# Patient Record
Sex: FEMALE | Race: WHITE | Employment: FULL TIME | URBAN - METROPOLITAN AREA
[De-identification: names, ages, dates, MRNs, and addresses within clinical notes are randomized per-mention and may not be internally consistent; named-entity substitution may affect disease eponyms.]

---

## 2017-12-01 ENCOUNTER — TRANSFERRED RECORDS (OUTPATIENT)
Dept: HEALTH INFORMATION MANAGEMENT | Facility: CLINIC | Age: 57
End: 2017-12-01

## 2017-12-01 ENCOUNTER — MEDICAL CORRESPONDENCE (OUTPATIENT)
Dept: HEALTH INFORMATION MANAGEMENT | Facility: CLINIC | Age: 57
End: 2017-12-01

## 2017-12-04 ENCOUNTER — MEDICAL CORRESPONDENCE (OUTPATIENT)
Dept: HEALTH INFORMATION MANAGEMENT | Facility: CLINIC | Age: 57
End: 2017-12-04

## 2018-03-18 ENCOUNTER — HEALTH MAINTENANCE LETTER (OUTPATIENT)
Age: 58
End: 2018-03-18

## 2018-03-19 ENCOUNTER — TELEPHONE (OUTPATIENT)
Dept: GASTROENTEROLOGY | Facility: CLINIC | Age: 58
End: 2018-03-19

## 2018-03-19 NOTE — TELEPHONE ENCOUNTER
Insurance: [employer]  Preferred Agent: This plan has open access, no preferred agent  Notes:

## 2018-03-22 DIAGNOSIS — Z76.89 ENCOUNTER TO ESTABLISH CARE: Primary | ICD-10-CM

## 2018-03-23 ENCOUNTER — OFFICE VISIT (OUTPATIENT)
Dept: GASTROENTEROLOGY | Facility: CLINIC | Age: 58
End: 2018-03-23
Attending: INTERNAL MEDICINE
Payer: COMMERCIAL

## 2018-03-23 VITALS
WEIGHT: 163 LBS | HEIGHT: 63 IN | TEMPERATURE: 98 F | BODY MASS INDEX: 28.88 KG/M2 | SYSTOLIC BLOOD PRESSURE: 115 MMHG | HEART RATE: 89 BPM | OXYGEN SATURATION: 94 % | DIASTOLIC BLOOD PRESSURE: 76 MMHG

## 2018-03-23 DIAGNOSIS — K83.01 PSC (PRIMARY SCLEROSING CHOLANGITIS) (H): Primary | ICD-10-CM

## 2018-03-23 DIAGNOSIS — Z76.89 ENCOUNTER TO ESTABLISH CARE: ICD-10-CM

## 2018-03-23 DIAGNOSIS — K50.00 CROHN'S DISEASE OF SMALL INTESTINE WITHOUT COMPLICATION (H): ICD-10-CM

## 2018-03-23 LAB
ALBUMIN SERPL-MCNC: 3.6 G/DL (ref 3.4–5)
ALP SERPL-CCNC: 92 U/L (ref 40–150)
ALT SERPL W P-5'-P-CCNC: 14 U/L (ref 0–50)
ANION GAP SERPL CALCULATED.3IONS-SCNC: 8 MMOL/L (ref 3–14)
AST SERPL W P-5'-P-CCNC: 12 U/L (ref 0–45)
BILIRUB DIRECT SERPL-MCNC: <0.1 MG/DL (ref 0–0.2)
BILIRUB SERPL-MCNC: 0.2 MG/DL (ref 0.2–1.3)
BUN SERPL-MCNC: 11 MG/DL (ref 7–30)
CALCIUM SERPL-MCNC: 8.7 MG/DL (ref 8.5–10.1)
CHLORIDE SERPL-SCNC: 109 MMOL/L (ref 94–109)
CO2 SERPL-SCNC: 23 MMOL/L (ref 20–32)
CREAT SERPL-MCNC: 0.77 MG/DL (ref 0.52–1.04)
ERYTHROCYTE [DISTWIDTH] IN BLOOD BY AUTOMATED COUNT: 16.1 % (ref 10–15)
GFR SERPL CREATININE-BSD FRML MDRD: 77 ML/MIN/1.7M2
GLUCOSE SERPL-MCNC: 90 MG/DL (ref 70–99)
HBV CORE AB SERPL QL IA: NONREACTIVE
HBV SURFACE AB SERPL IA-ACNC: 45.77 M[IU]/ML
HBV SURFACE AG SERPL QL IA: NONREACTIVE
HCT VFR BLD AUTO: 34.6 % (ref 35–47)
HCV AB SERPL QL IA: NONREACTIVE
HGB BLD-MCNC: 10.1 G/DL (ref 11.7–15.7)
INR PPP: 0.93 (ref 0.86–1.14)
MCH RBC QN AUTO: 24.3 PG (ref 26.5–33)
MCHC RBC AUTO-ENTMCNC: 29.2 G/DL (ref 31.5–36.5)
MCV RBC AUTO: 83 FL (ref 78–100)
PLATELET # BLD AUTO: 271 10E9/L (ref 150–450)
POTASSIUM SERPL-SCNC: 3.6 MMOL/L (ref 3.4–5.3)
PROT SERPL-MCNC: 7 G/DL (ref 6.8–8.8)
RBC # BLD AUTO: 4.15 10E12/L (ref 3.8–5.2)
SODIUM SERPL-SCNC: 140 MMOL/L (ref 133–144)
WBC # BLD AUTO: 7.3 10E9/L (ref 4–11)

## 2018-03-23 PROCEDURE — 87340 HEPATITIS B SURFACE AG IA: CPT | Performed by: INTERNAL MEDICINE

## 2018-03-23 PROCEDURE — 86704 HEP B CORE ANTIBODY TOTAL: CPT | Performed by: INTERNAL MEDICINE

## 2018-03-23 PROCEDURE — G0463 HOSPITAL OUTPT CLINIC VISIT: HCPCS | Mod: ZF

## 2018-03-23 PROCEDURE — 86803 HEPATITIS C AB TEST: CPT | Performed by: INTERNAL MEDICINE

## 2018-03-23 PROCEDURE — 80048 BASIC METABOLIC PNL TOTAL CA: CPT | Performed by: INTERNAL MEDICINE

## 2018-03-23 PROCEDURE — 85610 PROTHROMBIN TIME: CPT | Performed by: INTERNAL MEDICINE

## 2018-03-23 PROCEDURE — 86706 HEP B SURFACE ANTIBODY: CPT | Performed by: INTERNAL MEDICINE

## 2018-03-23 PROCEDURE — 85027 COMPLETE CBC AUTOMATED: CPT | Performed by: INTERNAL MEDICINE

## 2018-03-23 PROCEDURE — 36415 COLL VENOUS BLD VENIPUNCTURE: CPT | Performed by: INTERNAL MEDICINE

## 2018-03-23 PROCEDURE — 80076 HEPATIC FUNCTION PANEL: CPT | Performed by: INTERNAL MEDICINE

## 2018-03-23 RX ORDER — BUDESONIDE 0.25 MG/2ML
3 INHALANT ORAL DAILY
COMMUNITY
End: 2021-08-12

## 2018-03-23 RX ORDER — METAXALONE 800 MG/1
800 TABLET ORAL 3 TIMES DAILY
COMMUNITY
End: 2019-05-21 | Stop reason: ALTCHOICE

## 2018-03-23 RX ORDER — DICYCLOMINE HCL 20 MG
20 TABLET ORAL 4 TIMES DAILY PRN
COMMUNITY

## 2018-03-23 ASSESSMENT — PAIN SCALES - GENERAL: PAINLEVEL: MODERATE PAIN (4)

## 2018-03-23 NOTE — PROGRESS NOTES
[facility] [place] [facility]    Hepatology New Patient Visit    Referring provider:  Velma Lewis    HISTORY OF PRESENT ILLNESS:  Mrs. Oden is a 57-year-old  female who carries a diagnosis of Crohn's disease.  She has also eosinophilic esophagitis, myasthenia gravis, collagenous colitis and she is coming to us to evaluate her for primary sclerosing cholangitis.  Mrs. Oden tells me that she does not have any signs of cholestasis including pruritus or jaundice.  She has some abdominal pain and she relates that with her Crohn's disease.  She is moving her bowels like around 10 times a day with no blood in it at the time.  She has told me also that her weight has remained stable.  Appetite is good.  She has some nausea but no vomiting.  She has some dysphagia related with her eosinophilic esophagitis and in fact got dilated.  Denies any fever or any recent infections.  She did have in the outside hospitals imaging which included an MRCP, and although that was not compatible with PSC, her liver biopsy was compatible with  an early primary sclerosing cholangitis.     Patient denies jaundice, abdominal distension, lower extremity edema, lethargy or confusion. Has no history of melena, hematemesis or hematochezia. Patient also denies fevers, sweats, chills.    Medical hx Surgical hx   Past Medical History:   Diagnosis Date     Collagenous colitis      Crohn disease (H)      Esophagitis, eosinophilic      Myasthenia gravis (H)       No past surgical history on file.       Medications  Prior to Admission medications    Medication Sig Start Date End Date Taking? Authorizing Provider   fluticasone-salmeterol (ADVAIR) 500-50 MCG/DOSE diskus inhaler Inhale 1 puff into the lungs every 12 hours   Yes Reported, Patient   DULoxetine HCl (CYMBALTA PO) Take 60 mg by mouth 2 times daily   Yes Reported, Patient   LORAZEPAM PO Take 0.5 mg by mouth as needed for anxiety   Yes Reported, Patient   dicyclomine  "(BENTYL) 20 MG tablet Take 20 mg by mouth 4 times daily   Yes Reported, Patient   TOPIRAMATE PO Take 25 mg by mouth 2 times daily as needed   Yes Reported, Patient   ROPINIROLE HCL PO Take 0.25 mg by mouth 3 times daily   Yes Reported, Patient   budesonide (PULMICORT) 0.25 MG/2ML neb solution Take 3 mg by nebulization daily   Yes Reported, Patient   pyridostigmine (MESTINON) 60 MG/5ML syrup Take 60 mg by mouth 2 times daily   Yes Reported, Patient   PANTOPRAZOLE SODIUM PO Take 40 mg by mouth 2 times daily   Yes Reported, Patient   metaxalone (SKELAXIN) 800 MG tablet Take 800 mg by mouth 3 times daily   Yes Reported, Patient   GABAPENTIN PO Take 300 mg by mouth 2 times daily   Yes Reported, Patient   Buprenorphine HCl (BELBUCA) 150 MCG FILM buccal film Place 150 mcg inside cheek 2 times daily   Yes Reported, Patient   SUMATRIPTAN SUCCINATE PO Take 50 mg by mouth as needed for migraine   Yes Reported, Patient       Allergies  Allergies   Allergen Reactions     Azathioprine Nausea and Vomiting     Other reaction(s): Vomiting  ALSO SOB  Gi problems  ALSO SOB     Fish Anaphylaxis     All seafood and fish     Latex Anaphylaxis     Anaphylaxis to tree nuts     Shellfish Allergy Anaphylaxis     Tree Nuts  [Nuts] Anaphylaxis     Tree nuts (peanuts are OK)     Penicillins Rash     WAS VERY YOUNG DOESN'T REMEMBER VERY MUCH ABOUT IT     Seasonal Allergies      Other reaction(s): Runny Nose  HAYFEVER     Droperidol      Other reaction(s): Hypersensitivity  \"feels like she will crawl out of skin\"  Other reaction(s): Hypersensitivity     Morphine Hives and Itching     When pt recieves morphine sulphate IV pt has burning sensation and hives at site.     Pollen Extract      Other reaction(s): Runny Nose     Prochlorperazine      Extremely jittery.      Oxycodone Anxiety     Feels like I am crawling outa my skin       Family hx Social hx   Family History   Problem Relation Age of Onset     CEREBROVASCULAR DISEASE Mother      Colon " "Cancer Mother       Social History   Substance Use Topics     Smoking status: Former Smoker     Quit date: 3/1/1989     Smokeless tobacco: Never Used     Alcohol use Not on file          Review of systems  Mrs. Oden denies any recent infections.  She has fatigue.  No significant headaches or seizures.  She has some cough and shortness of breath which she attributes to asthma.  She denies any chest pain.  Has no easy bruising.  She has no psychiatric problems.  Carries a diagnosis of myasthenia gravis she has mostly in her eyes.  Otherwise, has no hearing issues and she has some back pain related with back surgery.  Otherwise, a comprehensive review of systems was noncontributory.     Examination  /76  Pulse 89  Temp 98  F (36.7  C) (Oral)  Ht 1.6 m (5' 3\")  Wt 73.9 kg (163 lb)  SpO2 94%  BMI 28.87 kg/m2  Body mass index is 28.87 kg/(m^2).    Gen- well, NAD, A+Ox3, normal color  Eye- EOMI  ENT- MMM, normal oropharynx  Lym- no palpable lymphadenopathy  CVS- S1, S2 normal, no added sounds, RRR  RS- CTA  Abd- Mildly obese. No hepatosplenomegaly.  Extr- pulses good, no HAWA  MS- hands normal- no clubbing  Neuro- A+Ox3, no asterixis  Skin- no rash or jaundice  Psych- normal mood    Laboratory  Lab Results   Component Value Date     03/23/2018    POTASSIUM 3.6 03/23/2018    CHLORIDE 109 03/23/2018    CO2 23 03/23/2018    BUN 11 03/23/2018    CR 0.77 03/23/2018       Lab Results   Component Value Date    BILITOTAL 0.2 03/23/2018    ALT 14 03/23/2018    AST 12 03/23/2018    ALKPHOS 92 03/23/2018       Lab Results   Component Value Date    ALBUMIN 3.6 03/23/2018    PROTTOTAL 7.0 03/23/2018        Lab Results   Component Value Date    WBC 7.3 03/23/2018    HGB 10.1 03/23/2018    MCV 83 03/23/2018     03/23/2018       Lab Results   Component Value Date    INR 0.93 03/23/2018         Radiology    Assessment and plan:  Ms. Oden is a 57-year-old  female with Crohn disease, eosinophilic esophagitis, " myasthenia gravis who also was diagnosed by liver biopsy when she had an elevation of her alkaline phosphatase with primary sclerosing cholangitis.  Imaging was not compatible with that and we were asked if doing an ERCP will be the way to go.  In my opinion, we will repeat her MRCP and only if the MRCP shows alterations then we will go forward and get ERCP.  When she gets her MRCP, we will also discuss with one of our interventional endoscopist, Juan Ramon Yin, and see if there is any utility in adding an ERCP on the MRCP findings.  Liver biopsy is very specific and was suggestive of early PSC.  If that is the case, then she will be followed and see if there is any change in her liver function tests.  If that happens, then that will be a time when you will need to do a liver biopsy or an MRCP itself.  There is an association between PSC and IBD and that increases the risk of colon cancer also.  We did discuss with her also the other complications of PSC including but not limited to cholangiocarcinoma.  For her other medical issues, she will follow with her primary care physician.      This was a 45-minute visit of which more than 50% was spent in explaining to the patient what our plan of care was.  We answered all of her questions.      cc:     Primary care physician       Susan jain MD  Hepatology  [facility]

## 2018-03-23 NOTE — MR AVS SNAPSHOT
After Visit Summary   3/23/2018    Wilma Oden    MRN: [id number]           Patient Information     Date Of Birth          1960        Visit Information        Provider Department      3/23/2018 10:00 AM Susan Griffiths MD [facility] Hepatology        Today's Diagnoses     PSC (primary sclerosing cholangitis)    -  1    Crohn's disease of small intestine without complication (H)           Follow-ups after your visit        Follow-up notes from your care team     Return in about 6 months (around 9/23/2018).      Your next 10 appointments already scheduled     Apr 04, 2018  9:00 AM CDT   (Arrive by 8:45 AM)   MR ABDOMEN MRCP W/O & W CONTRAST with UUMR1   [facility], [place], [facility] ([facility], [facility])    [address] [id number] [phone]           Take your medicines as usual, unless your doctor tells you not to. Bring a list of your current medicines to your exam (including vitamins, minerals and over-the-counter drugs). Also bring the results of similar scans you may have had.    You may or may not receive IV contrast for this exam pending the discretion of the Radiologist.   Do not eat or drink for 6 hours prior to exam.  The MRI machine uses a strong magnet. Please wear clothes without metal (snaps, zippers). A sweatsuit works well, or we may give you a hospital gown.  Please remove any body piercings and hair extensions before you arrive. You will also remove watches, jewelry, hairpins, wallets, dentures, partial dental plates and hearing aids. You may wear contact lenses, and you may be able to wear your rings. We have a safe place to keep your personal items, but it is safer to leave them at home.  **IMPORTANT** THE INSTRUCTIONS BELOW ARE ONLY FOR THOSE PATIENTS WHO HAVE BEEN PRESCRIBED SEDATION OR GENERAL ANESTHESIA DURING THEIR MRI PROCEDURE:  IF YOUR DOCTOR PRESCRIBED ORAL SEDATION (take medicine to help you  relax during your exam):   You must get the medicine from your doctor (oral medication) before you arrive. Bring the medicine to the exam. Do not take it at home. You ll be told when to take it upon arriving for your exam.   Arrive one hour early. Bring someone who can take you home after the test. Your medicine will make you sleepy. After the exam, you may not drive, take a bus or take a taxi by yourself.  IF YOUR DOCTOR PRESCRIBED IV SEDATION:   Arrive one hour early. Bring someone who can take you home after the test. Your medicine will make you sleepy. After the exam, you may not drive, take a bus or take a taxi by yourself.   No eating 6 hours before your exam. You may have clear liquids up until 4 hours before your exam. (Clear liquids include water, clear tea, black coffee and fruit juice without pulp.)  IF YOUR DOCTOR PRESCRIBED ANESTHESIA (be asleep for your exam):   Arrive 1 1/2 hours early. Bring someone who can take you home after the test. You may not drive, take a bus or take a taxi by yourself.   No eating 8 hours before your exam. You may have clear liquids up until 4 hours before your exam. (Clear liquids include water, clear tea, black coffee and fruit juice without pulp.)   You will spend four to five hours in the recovery room.  If you have any questions, please contact your Imaging Department exam site.              Future tests that were ordered for you today     Open Future Orders        Priority Expected Expires Ordered    MRI Abdomen w & w/o contrast mrcp Routine  4/22/2018 3/23/2018            Who to contact     If you have questions or need follow up information about today's clinic visit or your schedule please contact [facility] HEPATOLOGY directly at [phone].  Normal or non-critical lab and imaging results will be communicated to you by MyChart, letter or phone within 4 business days after the clinic has received the results. If you do not hear from us within 7 days, please contact  "the clinic through [employer] or phone. If you have a critical or abnormal lab result, we will notify you by phone as soon as possible.  Submit refill requests through [employer] or call your pharmacy and they will forward the refill request to us. Please allow 3 business days for your refill to be completed.          Additional Information About Your Visit        [employer] Information     [employer] gives you secure access to your electronic health record. If you see a primary care provider, you can also send messages to your care team and make appointments. If you have questions, please call your primary care clinic.  If you do not have a primary care provider, please call [phone] and they will assist you.        Care EveryWhere ID     This is your Care EveryWhere ID. This could be used by other organizations to access your [place] medical records  [id number]        Your Vitals Were     Pulse Temperature Height Pulse Oximetry BMI (Body Mass Index)       89 98  F (36.7  C) (Oral) 1.6 m (5' 3\") 94% 28.87 kg/m2        Blood Pressure from Last 3 Encounters:   03/23/18 115/76    Weight from Last 3 Encounters:   03/23/18 73.9 kg (163 lb)               Primary Care Provider Fax #    Provider Not In System [phone]                Equal Access to Services     MALCOLM JOSHI : Hadii alex drakeo Sojia, waaxda luqadaha, qaybta kaalmada adeegyada, nate [place]. So [facility] [phone].    ATENCIÓN: Si habla español, tiene a hyatt disposición servicios gratuitos de asistencia lingüística. Llame al [phone].    We comply with applicable federal civil rights laws and [place] laws. We do not discriminate on the basis of race, color, national origin, age, disability, sex, sexual orientation, or gender identity.            Thank you!     Thank you for choosing [facility] HEPATOLOGY  for your care. Our goal is always to provide you with excellent care. Hearing back from our patients is one way we can " continue to improve our services. Please take a few minutes to complete the written survey that you may receive in the mail after your visit with us. Thank you!             Your Updated Medication List - Protect others around you: Learn how to safely use, store and throw away your medicines at www.disposemymeds.org.          This list is accurate as of 3/23/18 11:30 AM.  Always use your most recent med list.                   Brand Name Dispense Instructions for use Diagnosis    BELBUCA 150 MCG Film buccal film   Generic drug:  Buprenorphine HCl      Place 150 mcg inside cheek 2 times daily    PSC (primary sclerosing cholangitis), Crohn's disease of small intestine without complication (H)       budesonide 0.25 MG/2ML neb solution    PULMICORT     Take 3 mg by nebulization daily    PSC (primary sclerosing cholangitis), Crohn's disease of small intestine without complication (H)       CYMBALTA PO      Take 60 mg by mouth 2 times daily    PSC (primary sclerosing cholangitis), Crohn's disease of small intestine without complication (H)       dicyclomine 20 MG tablet    BENTYL     Take 20 mg by mouth 4 times daily    PSC (primary sclerosing cholangitis), Crohn's disease of small intestine without complication (H)       fluticasone-salmeterol 500-50 MCG/DOSE diskus inhaler    ADVAIR     Inhale 1 puff into the lungs every 12 hours    PSC (primary sclerosing cholangitis), Crohn's disease of small intestine without complication (H)       GABAPENTIN PO      Take 300 mg by mouth 2 times daily    PSC (primary sclerosing cholangitis), Crohn's disease of small intestine without complication (H)       LORAZEPAM PO      Take 0.5 mg by mouth as needed for anxiety    PSC (primary sclerosing cholangitis), Crohn's disease of small intestine without complication (H)       metaxalone 800 MG tablet    SKELAXIN     Take 800 mg by mouth 3 times daily    PSC (primary sclerosing cholangitis), Crohn's disease of small intestine without  complication (H)       PANTOPRAZOLE SODIUM PO      Take 40 mg by mouth 2 times daily    PSC (primary sclerosing cholangitis), Crohn's disease of small intestine without complication (H)       pyridostigmine 60 MG/5ML syrup    MESTINON     Take 60 mg by mouth 2 times daily    PSC (primary sclerosing cholangitis), Crohn's disease of small intestine without complication (H)       ROPINIROLE HCL PO      Take 0.25 mg by mouth 3 times daily    PSC (primary sclerosing cholangitis), Crohn's disease of small intestine without complication (H)       SUMATRIPTAN SUCCINATE PO      Take 50 mg by mouth as needed for migraine    PSC (primary sclerosing cholangitis), Crohn's disease of small intestine without complication (H)       TOPIRAMATE PO      Take 25 mg by mouth 2 times daily as needed    PSC (primary sclerosing cholangitis), Crohn's disease of small intestine without complication (H)

## 2018-03-23 NOTE — NURSING NOTE
".  Chief Complaint   Patient presents with     Consult     establish care and refferal poss PSC        Initial /76  Pulse 89  Temp 98  F (36.7  C) (Oral)  Ht 1.6 m (5' 3\")  Wt 73.9 kg (163 lb)  SpO2 94%  BMI 28.87 kg/m2 Estimated body mass index is 28.87 kg/(m^2) as calculated from the following:    Height as of this encounter: 1.6 m (5' 3\").    Weight as of this encounter: 73.9 kg (163 lb).  Medication Reconciliation: complete   Kenneth Barriga, ABILIO      "

## 2018-03-23 NOTE — LETTER
3/23/2018       RE: Wilma Oden  [address] [place] [id number]     Dear Colleague,    Thank you for referring your patient, Wilma Oden, to the [facility] HEPATOLOGY at [facility]. Please see a copy of my visit note below.    [facility]    Hepatology New Patient Visit    Referring provider:  Velma Lewis    HISTORY OF PRESENT ILLNESS:  Mrs. Oden is a 57-year-old  female who carries a diagnosis of Crohn's disease.  She has also eosinophilic esophagitis, myasthenia gravis, collagenous colitis and she is coming to us to evaluate her for primary sclerosing cholangitis.  Mrs. Oden tells me that she does not have any signs of cholestasis including pruritus or jaundice.  She has some abdominal pain and she relates that with her Crohn's disease.  She is moving her bowels like around 10 times a day with no blood in it at the time.  She has told me also that her weight has remained stable.  Appetite is good.  She has some nausea but no vomiting.  She has some dysphagia related with her eosinophilic esophagitis and in fact got dilated.  Denies any fever or any recent infections.  She did have in the outside hospitals imaging which included an MRCP, and although that was not compatible with PSC, her liver biopsy was compatible with  an early primary sclerosing cholangitis.     Patient denies jaundice, abdominal distension, lower extremity edema, lethargy or confusion. Has no history of melena, hematemesis or hematochezia. Patient also denies fevers, sweats, chills.    Medical hx Surgical hx   Past Medical History:   Diagnosis Date     Collagenous colitis      Crohn disease (H)      Esophagitis, eosinophilic      Myasthenia gravis (H)       No past surgical history on file.       Medications  Prior to Admission medications    Medication Sig Start Date End Date Taking? Authorizing Provider   fluticasone-salmeterol (ADVAIR) 500-50  "MCG/DOSE diskus inhaler Inhale 1 puff into the lungs every 12 hours   Yes Reported, Patient   DULoxetine HCl (CYMBALTA PO) Take 60 mg by mouth 2 times daily   Yes Reported, Patient   LORAZEPAM PO Take 0.5 mg by mouth as needed for anxiety   Yes Reported, Patient   dicyclomine (BENTYL) 20 MG tablet Take 20 mg by mouth 4 times daily   Yes Reported, Patient   TOPIRAMATE PO Take 25 mg by mouth 2 times daily as needed   Yes Reported, Patient   ROPINIROLE HCL PO Take 0.25 mg by mouth 3 times daily   Yes Reported, Patient   budesonide (PULMICORT) 0.25 MG/2ML neb solution Take 3 mg by nebulization daily   Yes Reported, Patient   pyridostigmine (MESTINON) 60 MG/5ML syrup Take 60 mg by mouth 2 times daily   Yes Reported, Patient   PANTOPRAZOLE SODIUM PO Take 40 mg by mouth 2 times daily   Yes Reported, Patient   metaxalone (SKELAXIN) 800 MG tablet Take 800 mg by mouth 3 times daily   Yes Reported, Patient   GABAPENTIN PO Take 300 mg by mouth 2 times daily   Yes Reported, Patient   Buprenorphine HCl (BELBUCA) 150 MCG FILM buccal film Place 150 mcg inside cheek 2 times daily   Yes Reported, Patient   SUMATRIPTAN SUCCINATE PO Take 50 mg by mouth as needed for migraine   Yes Reported, Patient       Allergies  Allergies   Allergen Reactions     Azathioprine Nausea and Vomiting     Other reaction(s): Vomiting  ALSO SOB  Gi problems  ALSO SOB     Fish Anaphylaxis     All seafood and fish     Latex Anaphylaxis     Anaphylaxis to tree nuts     Shellfish Allergy Anaphylaxis     Tree Nuts  [Nuts] Anaphylaxis     Tree nuts (peanuts are OK)     Penicillins Rash     WAS VERY YOUNG DOESN'T REMEMBER VERY MUCH ABOUT IT     Seasonal Allergies      Other reaction(s): Runny Nose  HAYFEVER     Droperidol      Other reaction(s): Hypersensitivity  \"feels like she will crawl out of skin\"  Other reaction(s): Hypersensitivity     Morphine Hives and Itching     When pt recieves morphine sulphate IV pt has burning sensation and hives at site.     Pollen " "Extract      Other reaction(s): Runny Nose     Prochlorperazine      Extremely jittery.      Oxycodone Anxiety     Feels like I am crawling outa my skin       Family hx Social hx   Family History   Problem Relation Age of Onset     CEREBROVASCULAR DISEASE Mother      Colon Cancer Mother       Social History   Substance Use Topics     Smoking status: Former Smoker     Quit date: 3/1/1989     Smokeless tobacco: Never Used     Alcohol use Not on file          Review of systems  Mrs. Oden denies any recent infections.  She has fatigue.  No significant headaches or seizures.  She has some cough and shortness of breath which she attributes to asthma.  She denies any chest pain.  Has no easy bruising.  She has no psychiatric problems.  Carries a diagnosis of myasthenia gravis she has mostly in her eyes.  Otherwise, has no hearing issues and she has some back pain related with back surgery.  Otherwise, a comprehensive review of systems was noncontributory.     Examination  /76  Pulse 89  Temp 98  F (36.7  C) (Oral)  Ht 1.6 m (5' 3\")  Wt 73.9 kg (163 lb)  SpO2 94%  BMI 28.87 kg/m2  Body mass index is 28.87 kg/(m^2).    Gen- well, NAD, A+Ox3, normal color  Eye- EOMI  ENT- MMM, normal oropharynx  Lym- no palpable lymphadenopathy  CVS- S1, S2 normal, no added sounds, RRR  RS- CTA  Abd- Mildly obese. No hepatosplenomegaly.  Extr- pulses good, no HAWA  MS- hands normal- no clubbing  Neuro- A+Ox3, no asterixis  Skin- no rash or jaundice  Psych- normal mood    Laboratory  Lab Results   Component Value Date     03/23/2018    POTASSIUM 3.6 03/23/2018    CHLORIDE 109 03/23/2018    CO2 23 03/23/2018    BUN 11 03/23/2018    CR 0.77 03/23/2018       Lab Results   Component Value Date    BILITOTAL 0.2 03/23/2018    ALT 14 03/23/2018    AST 12 03/23/2018    ALKPHOS 92 03/23/2018       Lab Results   Component Value Date    ALBUMIN 3.6 03/23/2018    PROTTOTAL 7.0 03/23/2018        Lab Results   Component Value Date    WBC " 7.3 03/23/2018    HGB 10.1 03/23/2018    MCV 83 03/23/2018     03/23/2018       Lab Results   Component Value Date    INR 0.93 03/23/2018         Radiology    Assessment and plan:  Ms. Oden is a 57-year-old  female with Crohn disease, eosinophilic esophagitis, myasthenia gravis who also was diagnosed by liver biopsy when she had an elevation of her alkaline phosphatase with primary sclerosing cholangitis.  Imaging was not compatible with that and we were asked if doing an ERCP will be the way to go.  In my opinion, we will repeat her MRCP and only if the MRCP shows alterations then we will go forward and get ERCP.  When she gets her MRCP, we will also discuss with one of our interventional endoscopist, Juan Ramon Yin, and see if there is any utility in adding an ERCP on the MRCP findings.  Liver biopsy is very specific and was suggestive of early PSC.  If that is the case, then she will be followed and see if there is any change in her liver function tests.  If that happens, then that will be a time when you will need to do a liver biopsy or an MRCP itself.  There is an association between PSC and IBD and that increases the risk of colon cancer also.  We did discuss with her also the other complications of PSC including but not limited to cholangiocarcinoma.  For her other medical issues, she will follow with her primary care physician.      This was a 45-minute visit of which more than 50% was spent in explaining to the patient what our plan of care was.  We answered all of her questions.      cc:     Primary care physician       Susan jain MD  Hepatology  [facility]

## 2018-04-09 ENCOUNTER — TELEPHONE (OUTPATIENT)
Dept: GASTROENTEROLOGY | Facility: CLINIC | Age: 58
End: 2018-04-09

## 2018-04-09 NOTE — TELEPHONE ENCOUNTER
Writer spoke to patient.  Patient stated that she needed to know how much [facility]  She is already taking lorazepam 0.5 mg daily and stated that she will need more an wants to know what dose she will need to help with anxiety.

## 2018-04-10 ENCOUNTER — TELEPHONE (OUTPATIENT)
Dept: GASTROENTEROLOGY | Facility: CLINIC | Age: 58
End: 2018-04-10

## 2018-04-10 NOTE — TELEPHONE ENCOUNTER
Left voicemail message informing patient that for questions about how much lorazepam she should take for anxiety, she should consult with her primary care physician.

## 2018-04-11 ENCOUNTER — HOSPITAL ENCOUNTER (OUTPATIENT)
Dept: MRI IMAGING | Facility: CLINIC | Age: 58
Discharge: HOME | End: 2018-04-11
Attending: INTERNAL MEDICINE | Admitting: INTERNAL MEDICINE
Payer: COMMERCIAL

## 2018-04-11 DIAGNOSIS — K83.01 PSC (PRIMARY SCLEROSING CHOLANGITIS) (H): ICD-10-CM

## 2018-04-11 DIAGNOSIS — K50.00 CROHN'S DISEASE OF SMALL INTESTINE WITHOUT COMPLICATION (H): ICD-10-CM

## 2018-04-11 PROCEDURE — 74181 MRI ABDOMEN W/O CONTRAST: CPT

## 2018-04-18 ENCOUNTER — TELEPHONE (OUTPATIENT)
Dept: GASTROENTEROLOGY | Facility: CLINIC | Age: 58
End: 2018-04-18

## 2018-04-18 NOTE — TELEPHONE ENCOUNTER
Writer called patient.  Patient had questions about MRCP results.  Writer informed patient that findings were compatible with PSC and per Dr. Griffiths, plan is to have patient follow up with him every 6-12 months for care. Patient stated that she would like to be seen sooner to go over results as she was not pleased to be given results via [facility] and would like to speak with Dr. Griffiths.  Writer stated that will have Dr. Griffiths call patient next week to have a conversation about resullts and plan of care.  Patient expressed understanding and will be awaiting call.

## 2018-04-20 ENCOUNTER — TELEPHONE (OUTPATIENT)
Dept: GASTROENTEROLOGY | Facility: CLINIC | Age: 58
End: 2018-04-20

## 2018-04-20 NOTE — TELEPHONE ENCOUNTER
Dr. 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  Also informed her that he will be following her care for her PSC.  He informed her that he will call her next week, Tuesday, to further discuss any other questions she has.

## 2018-04-24 ENCOUNTER — TELEPHONE (OUTPATIENT)
Dept: GASTROENTEROLOGY | Facility: CLINIC | Age: 58
End: 2018-04-24

## 2018-04-24 NOTE — TELEPHONE ENCOUNTER
Dr. Griffiths spoke with patient.  Informed patient that findings on MRCP are consistent with low grade PSC.  The patient is to follow up with Dr. Griffiths every 6 to 12 months with alternate visits with patient's GI specialist.      Patient had concerns about right side pain.  Dr. Griffiths stated that MRCP did not show any evidence of stones.  If patient continues to have pain can follow with primary care or GI.     Patient agreed with plans and had no further questions.

## 2018-09-19 ENCOUNTER — MYC MEDICAL ADVICE (OUTPATIENT)
Dept: GASTROENTEROLOGY | Facility: CLINIC | Age: 58
End: 2018-09-19

## 2018-11-30 DIAGNOSIS — K83.01 PSC (PRIMARY SCLEROSING CHOLANGITIS) (H): Primary | ICD-10-CM

## 2018-12-03 ENCOUNTER — PATIENT OUTREACH (OUTPATIENT)
Dept: CARE COORDINATION | Facility: CLINIC | Age: 58
End: 2018-12-03

## 2018-12-05 ENCOUNTER — TELEPHONE (OUTPATIENT)
Dept: GASTROENTEROLOGY | Facility: CLINIC | Age: 58
End: 2018-12-05

## 2019-05-20 DIAGNOSIS — K83.01 PSC (PRIMARY SCLEROSING CHOLANGITIS) (H): Primary | ICD-10-CM

## 2019-05-21 ENCOUNTER — OFFICE VISIT (OUTPATIENT)
Dept: GASTROENTEROLOGY | Facility: CLINIC | Age: 59
End: 2019-05-21
Attending: PHYSICIAN ASSISTANT
Payer: COMMERCIAL

## 2019-05-21 VITALS
WEIGHT: 154 LBS | BODY MASS INDEX: 27.29 KG/M2 | SYSTOLIC BLOOD PRESSURE: 113 MMHG | OXYGEN SATURATION: 98 % | HEART RATE: 78 BPM | TEMPERATURE: 98.2 F | RESPIRATION RATE: 18 BRPM | HEIGHT: 63 IN | DIASTOLIC BLOOD PRESSURE: 81 MMHG

## 2019-05-21 DIAGNOSIS — K83.01 PSC (PRIMARY SCLEROSING CHOLANGITIS) (H): Primary | ICD-10-CM

## 2019-05-21 DIAGNOSIS — K83.01 PSC (PRIMARY SCLEROSING CHOLANGITIS) (H): ICD-10-CM

## 2019-05-21 LAB
ALBUMIN SERPL-MCNC: 3.9 G/DL (ref 3.4–5)
ALP SERPL-CCNC: 122 U/L (ref 40–150)
ALT SERPL W P-5'-P-CCNC: 21 U/L (ref 0–50)
ANION GAP SERPL CALCULATED.3IONS-SCNC: 8 MMOL/L (ref 3–14)
AST SERPL W P-5'-P-CCNC: 22 U/L (ref 0–45)
BILIRUB DIRECT SERPL-MCNC: <0.1 MG/DL (ref 0–0.2)
BILIRUB SERPL-MCNC: 0.4 MG/DL (ref 0.2–1.3)
BUN SERPL-MCNC: 14 MG/DL (ref 7–30)
CALCIUM SERPL-MCNC: 9.4 MG/DL (ref 8.5–10.1)
CHLORIDE SERPL-SCNC: 107 MMOL/L (ref 94–109)
CO2 SERPL-SCNC: 22 MMOL/L (ref 20–32)
CREAT SERPL-MCNC: 0.83 MG/DL (ref 0.52–1.04)
ERYTHROCYTE [DISTWIDTH] IN BLOOD BY AUTOMATED COUNT: 13.2 % (ref 10–15)
GFR SERPL CREATININE-BSD FRML MDRD: 78 ML/MIN/{1.73_M2}
GLUCOSE SERPL-MCNC: 87 MG/DL (ref 70–99)
HCT VFR BLD AUTO: 46.4 % (ref 35–47)
HGB BLD-MCNC: 14.6 G/DL (ref 11.7–15.7)
INR PPP: 0.98 (ref 0.86–1.14)
MCH RBC QN AUTO: 29.7 PG (ref 26.5–33)
MCHC RBC AUTO-ENTMCNC: 31.5 G/DL (ref 31.5–36.5)
MCV RBC AUTO: 94 FL (ref 78–100)
PLATELET # BLD AUTO: 224 10E9/L (ref 150–450)
POTASSIUM SERPL-SCNC: 4.2 MMOL/L (ref 3.4–5.3)
PROT SERPL-MCNC: 7.8 G/DL (ref 6.8–8.8)
RBC # BLD AUTO: 4.92 10E12/L (ref 3.8–5.2)
SODIUM SERPL-SCNC: 137 MMOL/L (ref 133–144)
WBC # BLD AUTO: 7.6 10E9/L (ref 4–11)

## 2019-05-21 PROCEDURE — 91200 LIVER ELASTOGRAPHY: CPT | Mod: ZF

## 2019-05-21 PROCEDURE — 85027 COMPLETE CBC AUTOMATED: CPT | Performed by: PHYSICIAN ASSISTANT

## 2019-05-21 PROCEDURE — 80048 BASIC METABOLIC PNL TOTAL CA: CPT | Performed by: PHYSICIAN ASSISTANT

## 2019-05-21 PROCEDURE — 36415 COLL VENOUS BLD VENIPUNCTURE: CPT | Performed by: PHYSICIAN ASSISTANT

## 2019-05-21 PROCEDURE — G0463 HOSPITAL OUTPT CLINIC VISIT: HCPCS | Mod: ZF

## 2019-05-21 PROCEDURE — 85610 PROTHROMBIN TIME: CPT | Performed by: PHYSICIAN ASSISTANT

## 2019-05-21 PROCEDURE — 80076 HEPATIC FUNCTION PANEL: CPT | Performed by: PHYSICIAN ASSISTANT

## 2019-05-21 RX ORDER — BACLOFEN 10 MG/1
10 TABLET ORAL
Refills: 0 | COMMUNITY
Start: 2019-03-14

## 2019-05-21 RX ORDER — MONTELUKAST SODIUM 4 MG/1
1 TABLET, CHEWABLE ORAL DAILY
Refills: 3 | COMMUNITY
Start: 2019-01-18 | End: 2021-08-12

## 2019-05-21 RX ORDER — ALBUTEROL SULFATE 90 UG/1
2 AEROSOL, METERED RESPIRATORY (INHALATION) EVERY 4 HOURS PRN
Refills: 2 | COMMUNITY
Start: 2019-05-20

## 2019-05-21 RX ORDER — HYDROCODONE BITARTRATE AND ACETAMINOPHEN 5; 325 MG/1; MG/1
1 TABLET ORAL EVERY 4 HOURS PRN
Refills: 0 | COMMUNITY
Start: 2019-05-16 | End: 2021-08-12

## 2019-05-21 RX ORDER — ALUMINUM HYDROXIDE, MAGNESIUM HYDROXIDE, SIMETHICONE 400; 400; 40 MG/10ML; MG/10ML; MG/10ML
30 SUSPENSION ORAL EVERY 6 HOURS PRN
COMMUNITY

## 2019-05-21 RX ORDER — ONDANSETRON 4 MG/1
4 TABLET, ORALLY DISINTEGRATING ORAL EVERY 8 HOURS PRN
Refills: 0 | COMMUNITY
Start: 2019-03-06

## 2019-05-21 RX ORDER — BUPROPION HYDROCHLORIDE 150 MG/1
150 TABLET ORAL DAILY
Refills: 0 | COMMUNITY
Start: 2019-05-20

## 2019-05-21 ASSESSMENT — PAIN SCALES - GENERAL: PAINLEVEL: MILD PAIN (3)

## 2019-05-21 ASSESSMENT — MIFFLIN-ST. JEOR: SCORE: 1252.67

## 2019-05-21 NOTE — PATIENT INSTRUCTIONS
Preventive Care:    Breast Cancer Screening: During our visit today, we discussed that it is recommended you receive breast cancer screening. Please call or make an appointment with your primary care provider to discuss this with them. You may also call the [facility] scheduling line ([phone]) to set up a mammography appointment at the [facility] within the [facility].

## 2019-05-21 NOTE — NURSING NOTE
"Chief Complaint   Patient presents with     RECHECK     PSC       Vital signs:  Temp: 98.2  F (36.8  C) Temp src: Oral BP: 113/81 Pulse: 78   Resp: 18 SpO2: 98 %     Height: 160 cm (5' 3\") Weight: 69.9 kg (154 lb)  Estimated body mass index is 27.28 kg/m  as calculated from the following:    Height as of this encounter: 1.6 m (5' 3\").    Weight as of this encounter: 69.9 kg (154 lb).          Lissa Alexandra [facility]  5/21/2019 2:07 PM      "

## 2019-05-21 NOTE — PROGRESS NOTES
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 She did not have any significant fibrosis at that time. She currently has normal transaminases and normal liver function. She has no stigmata of chronic liver disease.  We discussed indications for cholangiocarcinoma screening.       - Fibrosis scan to assess fibrosis   - Cholangiocarinoma screening:  MRI/MRCP in the near future    - Follow-up in clinic with Dr. Griffiths in 6 months     Kiki Palacios PA-C   [facility] Hepatology clinic    Discussed with Dr. Griffiths   -----------------------------------------------------       HPI:   Wilma Oden is a 59 year old female presenting for follow-up.     PSC   Diagnosed 4/2018   Liver biopsy: PSC/No significant fibrosis     Patient was last seen in clinic on 3/23/2018. No recent hospitalizations or ER visits.     She states she had an episode of flu-like symptoms including vomiting and diarrhea for half-day. This was followed by a week of itching that slowly improved.     She also has history of crohn's ileocolitis, eisinophilic esophogitis and is followed by GI at [facility]. Her crohn's and microscopic colitis has been well controlled.  She currently has 1-2 formed bowel movements for the past three months.     Patient denies jaundice, lower extremity edema, abdominal distension or confusion.   Patient also denies melena, hematochezia or hematemesis. Patient denies weight loss, fevers, sweats or chills.    She does not drink alcohol. She has also cut out caffeine out of her diet.      3/23/2018 09:43   Hep B Surface Agn Nonreactive   Hepatitis B Surface Antibody 45.77 (H)   Hepatitis B Core Lela Nonreactive   Hepatitis C Antibody Nonreactive       Medical hx Surgical hx   Past Medical History:   Diagnosis  "Date     Collagenous colitis      Crohn disease (H)      Esophagitis, eosinophilic      Myasthenia gravis (H)     No past surgical history on file.              Medications:     Current Outpatient Medications   Medication     albuterol (PROAIR HFA/PROVENTIL HFA/VENTOLIN HFA) 108 (90 Base) MCG/ACT inhaler     Alum & Mag Hydroxide-Simeth (ALUMINUM-MAGNESIUM-SIMETHICONE) 200-200-20 MG/5ML SUSP     baclofen (LIORESAL) 10 MG tablet     budesonide (PULMICORT) 0.25 MG/2ML neb solution     Buprenorphine HCl (BELBUCA) 150 MCG FILM buccal film     buPROPion (WELLBUTRIN XL) 150 MG 24 hr tablet     colestipol (COLESTID) 1 g tablet     dicyclomine (BENTYL) 20 MG tablet     DULoxetine HCl (CYMBALTA PO)     fluticasone-salmeterol (ADVAIR) 500-50 MCG/DOSE diskus inhaler     GABAPENTIN PO     HYDROcodone-acetaminophen (NORCO) 5-325 MG tablet     LORAZEPAM PO     ondansetron (ZOFRAN-ODT) 4 MG ODT tab     PANTOPRAZOLE SODIUM PO     ROPINIROLE HCL PO     SUMATRIPTAN SUCCINATE PO     certolizumab pegol (CIMZIA PREFILLED) 2 X 200 MG/ML KIT 2 syringes/kit     No current facility-administered medications for this visit.             Allergies:     Allergies   Allergen Reactions     Azathioprine Nausea and Vomiting     Other reaction(s): Vomiting  ALSO SOB  Gi problems  ALSO SOB     Fish Anaphylaxis     All seafood and fish     Latex Anaphylaxis     Anaphylaxis to tree nuts     Shellfish Allergy Anaphylaxis     Tree Nuts  [Nuts] Anaphylaxis     Tree nuts (peanuts are OK)     Penicillins Rash     WAS VERY YOUNG DOESN'T REMEMBER VERY MUCH ABOUT IT     Seasonal Allergies      Other reaction(s): Runny Nose  HAYFEVER     Droperidol      Other reaction(s): Hypersensitivity  \"feels like she will crawl out of skin\"  Other reaction(s): Hypersensitivity     Morphine Hives and Itching     When pt recieves morphine sulphate IV pt has burning sensation and hives at site.     Pollen Extract      Other reaction(s): Runny Nose     Prochlorperazine      " "Extremely jittery.      Oxycodone Anxiety     Feels like I am crawling outa my skin            Review of Systems:   10 points ROS was obtained and highlighted in the HPI, otherwise negative.          Physical Exam:   VS:  /81 (BP Location: Left arm, Patient Position: Sitting, Cuff Size: Adult Regular)   Pulse 78   Temp 98.2  F (36.8  C) (Oral)   Resp 18   Ht 1.6 m (5' 3\")   Wt 69.9 kg (154 lb)   SpO2 98%   BMI 27.28 kg/m        Gen: A&Ox3, NAD, well developed  HEENT: non-icteric   CV: RRR, no overt murmurs  Lung: CTA Bilatererally, no wheezing or crackles.   Lym- no palpable lymphadenopathy  Abd: central adiposity, soft, NT, ND, no organomegaly.   Ext: no edema, intact pulses.   Skin: No rash, no palmar erythema, telangiectasias or jaundice  Neuro: grossly intact, no asterixis   Psych: appropriate mood and affects           Data:   Reviewed in person and significant for:    Lab Results   Component Value Date     03/23/2018      Lab Results   Component Value Date    POTASSIUM 3.6 03/23/2018     Lab Results   Component Value Date    CHLORIDE 109 03/23/2018     Lab Results   Component Value Date    CO2 23 03/23/2018     Lab Results   Component Value Date    BUN 11 03/23/2018     Lab Results   Component Value Date    CR 0.77 03/23/2018       Lab Results   Component Value Date    WBC 7.6 05/21/2019     Lab Results   Component Value Date    HGB 14.6 05/21/2019     Lab Results   Component Value Date    HCT 46.4 05/21/2019     Lab Results   Component Value Date    MCV 94 05/21/2019     Lab Results   Component Value Date     05/21/2019       Lab Results   Component Value Date    AST 12 03/23/2018     Lab Results   Component Value Date    ALT 14 03/23/2018     No results found for: BILICONJ   Lab Results   Component Value Date    BILITOTAL 0.2 03/23/2018       Lab Results   Component Value Date    ALBUMIN 3.6 03/23/2018     Lab Results   Component Value Date    PROTTOTAL 7.0 03/23/2018      Lab " Results   Component Value Date    ALKPHOS 92 03/23/2018       Lab Results   Component Value Date    INR 0.93 03/23/2018 4/11/2018:   MR Abdomen w/o contrast:   1. Mild extrahepatic and intrahepatic biliary ductal dilatation, with  mildly beaded appearance irregularity of the central and left  intrahepatic bile ducts and mild peripheral pruning. No convincing  dominant strictures identified. Findings are compatible with provided  history of PSC.  2. Loss of haustral folds in the colon which can be associated with  inflammatory bowel disease.

## 2019-05-21 NOTE — LETTER
5/21/2019     RE: Wilma Oden  [address] [place] [id number]     Dear Colleague,    Thank you for referring your patient, Wilma Oden, to the [facility] HEPATOLOGY at [facility]. Please see a copy of my visit note below.    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 She did not have any significant fibrosis at that time. She currently has normal transaminases and normal liver function. She has no stigmata of chronic liver disease.  We discussed indications for cholangiocarcinoma screening.       - Fibrosis scan to assess fibrosis   - Cholangiocarinoma screening:  MRI/MRCP in the near future    - Follow-up in clinic with Dr. Griffiths in 6 months     Kiki Palacios PA-C   [facility] Hepatology clinic    Discussed with Dr. Griffiths   -----------------------------------------------------       HPI:   Wilma Oden is a 59 year old female presenting for follow-up.     PSC   Diagnosed 4/2018   Liver biopsy: PSC/No significant fibrosis     Patient was last seen in clinic on 3/23/2018. No recent hospitalizations or ER visits.     She states she had an episode of flu-like symptoms including vomiting and diarrhea for half-day. This was followed by a week of itching that slowly improved.     She also has history of crohn's ileocolitis, eisinophilic esophogitis and is followed by GI at [facility]. Her crohn's and microscopic colitis has been well controlled.  She currently has 1-2 formed bowel movements for the past three months.     Patient denies jaundice, lower extremity edema, abdominal distension or confusion.   Patient also denies melena, hematochezia or hematemesis. Patient denies weight loss, fevers, sweats or chills.    She does not drink alcohol. She  "has also cut out caffeine out of her diet.      3/23/2018 09:43   Hep B Surface Agn Nonreactive   Hepatitis B Surface Antibody 45.77 (H)   Hepatitis B Core Lela Nonreactive   Hepatitis C Antibody Nonreactive       Medical hx Surgical hx   Past Medical History:   Diagnosis Date     Collagenous colitis      Crohn disease (H)      Esophagitis, eosinophilic      Myasthenia gravis (H)     No past surgical history on file.              Medications:     Current Outpatient Medications   Medication     albuterol (PROAIR HFA/PROVENTIL HFA/VENTOLIN HFA) 108 (90 Base) MCG/ACT inhaler     Alum & Mag Hydroxide-Simeth (ALUMINUM-MAGNESIUM-SIMETHICONE) 200-200-20 MG/5ML SUSP     baclofen (LIORESAL) 10 MG tablet     budesonide (PULMICORT) 0.25 MG/2ML neb solution     Buprenorphine HCl (BELBUCA) 150 MCG FILM buccal film     buPROPion (WELLBUTRIN XL) 150 MG 24 hr tablet     colestipol (COLESTID) 1 g tablet     dicyclomine (BENTYL) 20 MG tablet     DULoxetine HCl (CYMBALTA PO)     fluticasone-salmeterol (ADVAIR) 500-50 MCG/DOSE diskus inhaler     GABAPENTIN PO     HYDROcodone-acetaminophen (NORCO) 5-325 MG tablet     LORAZEPAM PO     ondansetron (ZOFRAN-ODT) 4 MG ODT tab     PANTOPRAZOLE SODIUM PO     ROPINIROLE HCL PO     SUMATRIPTAN SUCCINATE PO     certolizumab pegol (CIMZIA PREFILLED) 2 X 200 MG/ML KIT 2 syringes/kit     No current facility-administered medications for this visit.             Allergies:     Allergies   Allergen Reactions     Azathioprine Nausea and Vomiting     Other reaction(s): Vomiting  ALSO SOB  Gi problems  ALSO SOB     Fish Anaphylaxis     All seafood and fish     Latex Anaphylaxis     Anaphylaxis to tree nuts     Shellfish Allergy Anaphylaxis     Tree Nuts  [Nuts] Anaphylaxis     Tree nuts (peanuts are OK)     Penicillins Rash     WAS VERY YOUNG DOESN'T REMEMBER VERY MUCH ABOUT IT     Seasonal Allergies      Other reaction(s): Runny Nose  HAYFEVER     Droperidol      Other reaction(s): Hypersensitivity  \"feels " "like she will crawl out of skin\"  Other reaction(s): Hypersensitivity     Morphine Hives and Itching     When pt recieves morphine sulphate IV pt has burning sensation and hives at site.     Pollen Extract      Other reaction(s): Runny Nose     Prochlorperazine      Extremely jittery.      Oxycodone Anxiety     Feels like I am crawling outa my skin            Review of Systems:   10 points ROS was obtained and highlighted in the HPI, otherwise negative.          Physical Exam:   VS:  /81 (BP Location: Left arm, Patient Position: Sitting, Cuff Size: Adult Regular)   Pulse 78   Temp 98.2  F (36.8  C) (Oral)   Resp 18   Ht 1.6 m (5' 3\")   Wt 69.9 kg (154 lb)   SpO2 98%   BMI 27.28 kg/m         Gen: A&Ox3, NAD, well developed  HEENT: non-icteric   CV: RRR, no overt murmurs  Lung: CTA Bilatererally, no wheezing or crackles.   Lym- no palpable lymphadenopathy  Abd: central adiposity, soft, NT, ND, no organomegaly.   Ext: no edema, intact pulses.   Skin: No rash, no palmar erythema, telangiectasias or jaundice  Neuro: grossly intact, no asterixis   Psych: appropriate mood and affects           Data:   Reviewed in person and significant for:    Lab Results   Component Value Date     03/23/2018      Lab Results   Component Value Date    POTASSIUM 3.6 03/23/2018     Lab Results   Component Value Date    CHLORIDE 109 03/23/2018     Lab Results   Component Value Date    CO2 23 03/23/2018     Lab Results   Component Value Date    BUN 11 03/23/2018     Lab Results   Component Value Date    CR 0.77 03/23/2018       Lab Results   Component Value Date    WBC 7.6 05/21/2019     Lab Results   Component Value Date    HGB 14.6 05/21/2019     Lab Results   Component Value Date    HCT 46.4 05/21/2019     Lab Results   Component Value Date    MCV 94 05/21/2019     Lab Results   Component Value Date     05/21/2019       Lab Results   Component Value Date    AST 12 03/23/2018     Lab Results   Component Value Date "    ALT 14 03/23/2018     No results found for: BILICONJ   Lab Results   Component Value Date    BILITOTAL 0.2 03/23/2018       Lab Results   Component Value Date    ALBUMIN 3.6 03/23/2018     Lab Results   Component Value Date    PROTTOTAL 7.0 03/23/2018      Lab Results   Component Value Date    ALKPHOS 92 03/23/2018       Lab Results   Component Value Date    INR 0.93 03/23/2018 4/11/2018:   MR Abdomen w/o contrast:   1. Mild extrahepatic and intrahepatic biliary ductal dilatation, with  mildly beaded appearance irregularity of the central and left  intrahepatic bile ducts and mild peripheral pruning. No convincing  dominant strictures identified. Findings are compatible with provided  history of PSC.  2. Loss of haustral folds in the colon which can be associated with  inflammatory bowel disease.     Again, thank you for allowing me to participate in the care of your patient.      Sincerely,    Kiki Palacios PA-C

## 2019-05-21 NOTE — LETTER
"    May 25, 2019       TO: Wilma APPIAH Akshat  [address] [place], [place] [id number]       Dear Ms. Oden,    We are writing to inform you of your test results.    The scan to assess scar tissue (fibrosis) showed that you have minimal to no scarring. We give the liver fibrosis a score from 0 to 4. 0 means no scar tissue. 1 means a little bit (mild). 2 is some (moderate). 3 is called \"bridging fibrosis\" and represents significant scarring (severe). 4 indicates cirrhosis.     Your score was 0-1.     It was a pleasure to see you at your recent visit. Please let me know if you have any questions or concerns.     Clinic Staff - [phone] option 3     Sincerely,     Kiki Palacios PA-C  [address] [facility], Mail [id number]  [place]  [address].            "

## 2019-10-02 ENCOUNTER — DOCUMENTATION ONLY (OUTPATIENT)
Dept: CARE COORDINATION | Facility: CLINIC | Age: 59
End: 2019-10-02

## 2019-10-02 ENCOUNTER — MYC MEDICAL ADVICE (OUTPATIENT)
Dept: CARE COORDINATION | Facility: CLINIC | Age: 59
End: 2019-10-02

## 2019-11-14 DIAGNOSIS — K83.01 PSC (PRIMARY SCLEROSING CHOLANGITIS) (H): Primary | ICD-10-CM

## 2019-11-27 ENCOUNTER — TELEPHONE (OUTPATIENT)
Dept: GASTROENTEROLOGY | Facility: CLINIC | Age: 59
End: 2019-11-27

## 2019-11-27 NOTE — TELEPHONE ENCOUNTER
Patient contacted and reminded of upcoming appointment.  Patient confirmed they will be attending.  Patient instructed to bring updated medications list to appointment.    Emilee Keller on 11/27/2019 at 1:21 PM

## 2020-03-01 ENCOUNTER — HEALTH MAINTENANCE LETTER (OUTPATIENT)
Age: 60
End: 2020-03-01

## 2020-03-09 ENCOUNTER — TRANSFERRED RECORDS (OUTPATIENT)
Dept: HEALTH INFORMATION MANAGEMENT | Facility: CLINIC | Age: 60
End: 2020-03-09

## 2020-05-21 ENCOUNTER — TRANSFERRED RECORDS (OUTPATIENT)
Dept: HEALTH INFORMATION MANAGEMENT | Facility: CLINIC | Age: 60
End: 2020-05-21

## 2020-05-27 ENCOUNTER — TRANSFERRED RECORDS (OUTPATIENT)
Dept: HEALTH INFORMATION MANAGEMENT | Facility: CLINIC | Age: 60
End: 2020-05-27

## 2020-09-24 ENCOUNTER — TRANSFERRED RECORDS (OUTPATIENT)
Dept: HEALTH INFORMATION MANAGEMENT | Facility: CLINIC | Age: 60
End: 2020-09-24

## 2020-10-27 ENCOUNTER — TRANSCRIBE ORDERS (OUTPATIENT)
Dept: OTHER | Age: 60
End: 2020-10-27

## 2020-10-27 DIAGNOSIS — G70.01 MYASTHENIA GRAVIS WITH EXACERBATION (H): ICD-10-CM

## 2020-10-27 DIAGNOSIS — H53.2 DIPLOPIA: Primary | ICD-10-CM

## 2020-10-27 DIAGNOSIS — H02.409 PTOSIS: ICD-10-CM

## 2020-10-29 NOTE — TELEPHONE ENCOUNTER
FUTURE VISIT INFORMATION      FUTURE VISIT INFORMATION:    Date: 11/11/20    Time: 9:00am    Location: [facility] [place]  REFERRAL INFORMATION:    Referring provider:  Dr. Bliss     Referring providers clinic:  [facility]    Reason for visit/diagnosis   2nd opinion for ptosis and diplopia that is in part due to Myasthenia Gravis     RECORDS REQUESTED FROM:       Clinic name Comments Records Status Imaging Status   [place] [facility] Request for recs sent 10/29 EPIC

## 2020-11-11 ENCOUNTER — PRE VISIT (OUTPATIENT)
Dept: OPHTHALMOLOGY | Facility: CLINIC | Age: 60
End: 2020-11-11

## 2020-11-11 NOTE — TELEPHONE ENCOUNTER
FUTURE VISIT INFORMATION      FUTURE VISIT INFORMATION:    Date: 12/2/20    Time: 8:30am    Location: [facility] [place]  REFERRAL INFORMATION:    Referring provider:   Dr. Bliss    Referring providers clinic:  [facility]    Reason for visit/diagnosis  2nd opinion for ptosis and diplopia that is in part due to Myasthenia Gravis    RECORDS REQUESTED FROM:       Clinic name Comments Records Status Imaging Status   [facility] Request for recs sent 11/11

## 2020-12-02 ENCOUNTER — PRE VISIT (OUTPATIENT)
Dept: OPHTHALMOLOGY | Facility: CLINIC | Age: 60
End: 2020-12-02

## 2020-12-13 ENCOUNTER — HEALTH MAINTENANCE LETTER (OUTPATIENT)
Age: 60
End: 2020-12-13

## 2021-03-31 ENCOUNTER — TRANSCRIBE ORDERS (OUTPATIENT)
Dept: OTHER | Age: 61
End: 2021-03-31

## 2021-03-31 DIAGNOSIS — H02.409 PTOSIS: Primary | ICD-10-CM

## 2021-04-17 ENCOUNTER — HEALTH MAINTENANCE LETTER (OUTPATIENT)
Age: 61
End: 2021-04-17

## 2021-07-14 ENCOUNTER — OFFICE VISIT (OUTPATIENT)
Dept: OPHTHALMOLOGY | Facility: CLINIC | Age: 61
End: 2021-07-14
Payer: COMMERCIAL

## 2021-07-14 DIAGNOSIS — H53.2 DIPLOPIA: ICD-10-CM

## 2021-07-14 DIAGNOSIS — H02.403 ACQUIRED PTOSIS OF EYELID OF BOTH EYES: ICD-10-CM

## 2021-07-14 DIAGNOSIS — G70.00 MYASTHENIA GRAVIS (H): Primary | ICD-10-CM

## 2021-07-14 PROCEDURE — 92082 INTERMEDIATE VISUAL FIELD XM: CPT | Performed by: OPHTHALMOLOGY

## 2021-07-14 PROCEDURE — 99204 OFFICE O/P NEW MOD 45 MIN: CPT | Performed by: OPHTHALMOLOGY

## 2021-07-14 PROCEDURE — 92285 EXTERNAL OCULAR PHOTOGRAPHY: CPT | Performed by: OPHTHALMOLOGY

## 2021-07-14 RX ORDER — GABAPENTIN 300 MG/1
4 CAPSULE ORAL 3 TIMES DAILY
COMMUNITY
Start: 2021-06-28

## 2021-07-14 RX ORDER — DIPHENOXYLATE HCL/ATROPINE 2.5-.025MG
2 TABLET ORAL PRN
COMMUNITY
Start: 2021-03-09

## 2021-07-14 RX ORDER — LANSOPRAZOLE 30 MG/1
30 CAPSULE, DELAYED RELEASE ORAL 2 TIMES DAILY
COMMUNITY
Start: 2021-02-19

## 2021-07-14 RX ORDER — DOXEPIN HYDROCHLORIDE 10 MG/1
10 CAPSULE ORAL
COMMUNITY
Start: 2021-03-22

## 2021-07-14 RX ORDER — ACETAMINOPHEN 160 MG/5ML
325-650 SUSPENSION ORAL PRN
COMMUNITY
Start: 2021-03-09

## 2021-07-14 RX ORDER — OXYCODONE HYDROCHLORIDE 5 MG/1
5 TABLET ORAL EVERY 4 HOURS PRN
COMMUNITY
Start: 2020-02-21

## 2021-07-14 RX ORDER — DOXEPIN HYDROCHLORIDE 50 MG/1
50 CAPSULE ORAL
COMMUNITY
Start: 2020-11-16

## 2021-07-14 RX ORDER — BENZONATATE 100 MG/1
100-200 CAPSULE ORAL PRN
COMMUNITY
Start: 2021-06-14 | End: 2021-08-12

## 2021-07-14 RX ORDER — LOPERAMIDE HCL 2 MG
2 CAPSULE ORAL PRN
COMMUNITY
Start: 2021-04-23 | End: 2021-08-12

## 2021-07-14 ASSESSMENT — VISUAL ACUITY
CORRECTION_TYPE: GLASSES
OD_CC: 20/30
OS_CC+: +2
METHOD: SNELLEN - LINEAR
OS_CC: 20/30

## 2021-07-14 ASSESSMENT — TONOMETRY
OS_IOP_MMHG: 11
IOP_METHOD: ICARE
OD_IOP_MMHG: 11

## 2021-07-14 ASSESSMENT — CONF VISUAL FIELD: COMMENTS: TANGENT VISUAL FIELD PERFORMED TODAY.

## 2021-07-14 NOTE — PROGRESS NOTES
Oculoplastic Clinic New Patient    Patient: Wilma Oden MRN# [id number]   YOB: 1960 Age: 61 year old   Date of Visit: Jul 14, 2021    CC: Droopy eyelids obstructing vision.              HPI:     Chief Complaint(s) and History of Present Illness(es)     Droopy Eye Lid Evaluation     Laterality: right upper lid and left upper lid      Comments    Patient referred by Dr. Sorensen for ptosis evaluation. Pt states ptosis has   been bothersome for quite some time. Has myasthenia gravis. Neurologist is   Dr. Braswell.        Wilma Oden is a 61 year old female who has noted gradual onset of droopy eyelids over the past years. The droopy eyelid is interfering with activities of daily living including driving, and reading. She has intermittent double vision, and a diagnosis of Myasthenia Gravis. Has been on Cellcept, prednisone, and Mestinon but it did not help so she stopped. Used to be seropositive, but has become seronegative on her MG labs.    EXAM:     MRD1: 0 mm right eye an -1 mm left eye   Severe ptosis  Moderate to good orbicularis function    VISUAL FIELD:  Right eye untaped:35 degrees Right eye taped:50 degrees  Left eye untaped:35 degrees Left eye taped:50 degrees    Assessment & Plan     Wilma Oden is a 61 year old female with the following diagnoses:   1. Myasthenia gravis (H)    2. Acquired ptosis of eyelid of both eyes    3. Diplopia       Bilateral ptosis repair external levator approach (skin, small nf both eyes, levator adv more left).    Discussed risk of ptosis repair in setting of MG. Also offered eval with Dr. Zamudio to see if he can help with diplopia prior to ptosis repair. She is not interested at this time.     ANTICOAGULATION:  None         PHOTOS DEMONSTRATE:    Blepharoptosis      Attending Physician Attestation: Complete documentation of historical and exam elements from today's encounter can be found in the full encounter summary report (not reduplicated in this progress  note). I personally obtained the chief complaint(s) and history of present illness. I confirmed and edited as necessary the review of systems, past medical/surgical history, family history, social history, and examination findings as documented by others; and I examined the patient myself. I personally reviewed the relevant tests, images, and reports as documented above. I formulated and edited as necessary the assessment and plan and discussed the findings and management plan with the patient. Jaja Carlson MD      Today with Wilma Oden I reviewed the indications, risks, benefits, and alternatives of the proposed surgical procedure including, but not limited to, failure obtain the desired result  and need for additional surgery, bleeding, infection, loss of vision, loss of the eye, and the remote possibility of permanent damage to any organ system or death with the use of anesthesia.  I provided multiple opportunities for the questions, answered all questions to the best of my ability, and confirmed that my answers and my discussion were understood.

## 2021-07-14 NOTE — LETTER
2021         RE:  :  MRN: Wilma Oden  1960  [id number]     Dear Dr. Sorensen,    Thank you for asking me to see your patient, Wilma Oden, for an oculoplastic   consultation.  My assessment and plan are below.  For further details, please see my attached clinic note.           HPI:     Chief Complaint(s) and History of Present Illness(es)     Droopy Eye Lid Evaluation     Laterality: right upper lid and left upper lid      Comments    Patient referred by Dr. Sorensen for ptosis evaluation. Pt states ptosis has   been bothersome for quite some time. Has myasthenia gravis. Neurologist is   Dr. Braswell.        Wilma Oden is a 61 year old female who has noted gradual onset of droopy eyelids over the past years. The droopy eyelid is interfering with activities of daily living including driving, and reading. She has intermittent double vision, and a diagnosis of Myasthenia Gravis. Has been on Cellcept, prednisone, and Mestinon but it did not help so she stopped. Used to be seropositive, but has become seronegative on her MG labs.    EXAM:     MRD1: 0 mm right eye an -1 mm left eye   Severe ptosis  Moderate to good orbicularis function    VISUAL FIELD:  Right eye untaped:35 degrees Right eye taped:50 degrees  Left eye untaped:35 degrees Left eye taped:50 degrees    Assessment & Plan     Wilma Oden is a 61 year old female with the following diagnoses:   1. Myasthenia gravis (H)    2. Acquired ptosis of eyelid of both eyes    3. Diplopia       Bilateral ptosis repair external levator approach (skin, small nf both eyes, levator adv more left).    Discussed risk of ptosis repair in setting of MG. Also offered eval with Dr. Zamudio to see if he can help with diplopia prior to ptosis repair. She is not interested at this time.     ANTICOAGULATION:  None        Again, thank you for allowing me to participate in the care of your patient.      Sincerely,    Jaja Carlson MD  Department of Ophthalmology and  Visual Neurosciences  [facility]    CC: [facility]  [address] [address] [facility] [address]  Via Fax: [phone]

## 2021-07-14 NOTE — NURSING NOTE
Chief Complaints and History of Present Illnesses   Patient presents with     Droopy Eye Lid Evaluation       Chief Complaint(s) and History of Present Illness(es)     Droopy Eye Lid Evaluation     Laterality: right upper lid and left upper lid              Comments     Patient referred by Dr. Sorensen for ptosis evaluation. Pt states ptosis has been bothersome for quite some time. Has myasthenia gravis. Neurologist is Dr. Braswell.                 Allyson Morgan, COA

## 2021-07-15 DIAGNOSIS — Z11.59 ENCOUNTER FOR SCREENING FOR OTHER VIRAL DISEASES: ICD-10-CM

## 2021-07-15 PROBLEM — H02.403 ACQUIRED PTOSIS OF EYELID OF BOTH EYES: Status: ACTIVE | Noted: 2021-07-15

## 2021-07-15 PROBLEM — G70.00 MYASTHENIA GRAVIS (H): Status: ACTIVE | Noted: 2021-07-15

## 2021-07-28 ENCOUNTER — TELEPHONE (OUTPATIENT)
Dept: OPHTHALMOLOGY | Facility: CLINIC | Age: 61
End: 2021-07-28

## 2021-07-28 NOTE — TELEPHONE ENCOUNTER
[facility] Call Center    Phone Message    May a detailed message be left on voicemail: yes     Reason for Call: Patient calling stating the doctor needs to call the insurance company to discuss procedure on 8/12/21. Insurance company can be reached at [phone]. Patient also states the insurance company also sent doctor some paperwork for it. Please advise. Thank you    Action Taken: Message routed to:  Adult Clinics: Eye p [id number]    Travel Screening: Not Applicable

## 2021-07-28 NOTE — TELEPHONE ENCOUNTER
A peer to peer has been scheduled for 8/3/2021 on behalf of the [facility] where the patient will [facility] her procedure. Currently the Prior authorization for the surgery has been denied. Pending outcome of peer to peer.

## 2021-07-29 ENCOUNTER — TELEPHONE (OUTPATIENT)
Dept: OPHTHALMOLOGY | Facility: CLINIC | Age: 61
End: 2021-07-29

## 2021-07-29 NOTE — TELEPHONE ENCOUNTER
[facility] Call Center    Phone Message    May a detailed message be left on voicemail: yes     Reason for Call: Patient calling wanting to know if she should continue with pre op and Covid testing for Surgery as planned. Patient would like a call back today. Please advise. Thank you    Action Taken: Message routed to:  Adult Clinics: Eye p [id number]    Travel Screening: Not Applicable

## 2021-07-29 NOTE — TELEPHONE ENCOUNTER
Called and spoke to patient. Let her know that there is a peer to peer review scheduled for 8/3/21 to hopefully get insurance to authorize her surgery. She will wait to hear on the results of the review.     Allyson Morgan, COA, 10:34 AM 07/29/2021

## 2021-07-29 NOTE — TELEPHONE ENCOUNTER
LM for pt letting her know to continue with pre op and covid testing for surgery. Peer to peer is 8/3 and covid test is 8/9, unsure when pre op is scheduled for.     Allyson Morgan, COA, 3:13 PM 07/29/2021

## 2021-08-05 ENCOUNTER — TELEPHONE (OUTPATIENT)
Dept: OPHTHALMOLOGY | Facility: CLINIC | Age: 61
End: 2021-08-05

## 2021-08-05 NOTE — TELEPHONE ENCOUNTER
Evelio Oconnell from  Financial Counseling:     Mechelle from the  should be the following up. She just sent a message stating that she has reached out to the patient and advised her of the current status.       Allyson Morgan, COA, 1:28 PM 08/05/2021

## 2021-08-05 NOTE — TELEPHONE ENCOUNTER
Call Center: Please forward all calls regarding pts surgery and status of insurance to: Mechelle Ryan. [facility] is unable to help with this.     Allyson Morgan, COA, 3:29 PM 08/05/2021

## 2021-08-05 NOTE — TELEPHONE ENCOUNTER
[facility] Call Center    Phone Message    May a detailed message be left on voicemail: yes     Reason for Call: Patient called wanting to discuss her peer to peer with a care team member because it was denied again. Please advise. Thank you.    Action Taken: Message routed to:  Adult Clinics: Eye p [id number]    Travel Screening: Not Applicable

## 2021-08-05 NOTE — TELEPHONE ENCOUNTER
Pt calling again to find out what the status of the appeal is that was filed this morning.  Please call her back to let her know.  Thanks.

## 2021-08-09 ENCOUNTER — LAB (OUTPATIENT)
Dept: LAB | Facility: CLINIC | Age: 61
End: 2021-08-09
Payer: COMMERCIAL

## 2021-08-09 DIAGNOSIS — Z11.59 ENCOUNTER FOR SCREENING FOR OTHER VIRAL DISEASES: ICD-10-CM

## 2021-08-09 PROCEDURE — U0005 INFEC AGEN DETEC AMPLI PROBE: HCPCS

## 2021-08-09 PROCEDURE — U0003 INFECTIOUS AGENT DETECTION BY NUCLEIC ACID (DNA OR RNA); SEVERE ACUTE RESPIRATORY SYNDROME CORONAVIRUS 2 (SARS-COV-2) (CORONAVIRUS DISEASE [COVID-19]), AMPLIFIED PROBE TECHNIQUE, MAKING USE OF HIGH THROUGHPUT TECHNOLOGIES AS DESCRIBED BY CMS-2020-01-R: HCPCS

## 2021-08-10 ENCOUNTER — TELEPHONE (OUTPATIENT)
Dept: OPHTHALMOLOGY | Facility: CLINIC | Age: 61
End: 2021-08-10

## 2021-08-10 LAB — SARS-COV-2 RNA RESP QL NAA+PROBE: NEGATIVE

## 2021-08-10 NOTE — TELEPHONE ENCOUNTER
Received confirmation from Dr. Wilkinson that as long as [address]62 is approved we should be good for the surgery. Spoke with the patient to relay his message who appreciated getting the go ahead for the surgery. She asked for a map to be emailed to her. I confirmed her email address and sent the [facility] pdf document with directions.   Jennifer Kolb, Surgery Scheduling Coordinator

## 2021-08-10 NOTE — TELEPHONE ENCOUNTER
[facility] Call Center    Phone Message    May a detailed message be left on voicemail: yes     Reason for Call: Pt called regarding her surgery on Thursday.  She said that her insurance only approved part of the procedure on the eyelid.  They didn't approve the eyebrow part.  Pt wants to know if Dr. Wilkinson can still achieve the outcome he was looking for with only the eyelid portion and is surgery still on for Thursday.  If any questions for the insurance company you can contact Shruti at [phone].  Please call Pt to discuss.    Action Taken: Message routed to:  Adult Clinics: Eye p [id number]    Travel Screening: Not Applicable

## 2021-08-10 NOTE — TELEPHONE ENCOUNTER
Spoke with the patient about her upcoming surgery. I told her that the last report that we got from [facility] stated that they denied the surgery, so we would have to cancel while we are appealing the decision. She said she got a call this morning from Shruti at [facility] and part of the surgery has been approved, so she wants to know if she is still able to go ahead with the procedure. I asked if she knew which procedure codes were approved, and wasn't sure but suggested contacting Shruti to get that information. I left a message for Shruti who called back immediately to relay that CPT codes 07218 and 06381 bilaterally have been approved. A message has been sent to the financial team and Dr. Wilkinson to confirm that the patient can go ahead with surgery this Thursday. I told her that I would call her back once I get confirmation.    Jennifer Kolb, Surgery Scheduling Coordinator

## 2021-08-11 ENCOUNTER — ANESTHESIA EVENT (OUTPATIENT)
Dept: SURGERY | Facility: AMBULATORY SURGERY CENTER | Age: 61
End: 2021-08-11
Payer: COMMERCIAL

## 2021-08-11 RX ORDER — ERYTHROMYCIN 5 MG/G
OINTMENT OPHTHALMIC
Qty: 3.5 G | Refills: 0 | Status: SHIPPED | OUTPATIENT
Start: 2021-08-11 | End: 2021-08-12

## 2021-08-11 NOTE — BRIEF OP NOTE
[facility]    Brief Operative Note    Pre-operative diagnosis: Myasthenia gravis (H) [G70.00]  Acquired ptosis of eyelid of both eyes [H02.403]  Post-operative diagnosis Same as pre-operative diagnosis    Procedure: Procedure(s):  Bilateral upper eyelid ptosis repair  Surgeon: Surgeon(s) and Role:     * Jaja Carlson MD - Primary     * Jeannie Nguyen MD - Fellow - Assisting    * Cahri Orlando MD - Resident - Assisting    Anesthesia: Combined MAC with Local   Estimated blood loss: Minimal  Drains: None  Specimens: * No specimens in log *  Findings:   As expected.  Complications: None.  Implants: * No implants in log *

## 2021-08-12 ENCOUNTER — ANESTHESIA (OUTPATIENT)
Dept: SURGERY | Facility: AMBULATORY SURGERY CENTER | Age: 61
End: 2021-08-12
Payer: COMMERCIAL

## 2021-08-12 ENCOUNTER — HOSPITAL ENCOUNTER (OUTPATIENT)
Facility: AMBULATORY SURGERY CENTER | Age: 61
End: 2021-08-12
Attending: OPHTHALMOLOGY | Admitting: OPHTHALMOLOGY
Payer: COMMERCIAL

## 2021-08-12 VITALS
BODY MASS INDEX: 30.12 KG/M2 | OXYGEN SATURATION: 97 % | HEART RATE: 70 BPM | SYSTOLIC BLOOD PRESSURE: 120 MMHG | DIASTOLIC BLOOD PRESSURE: 67 MMHG | WEIGHT: 170 LBS | RESPIRATION RATE: 18 BRPM | HEIGHT: 63 IN | TEMPERATURE: 96.8 F

## 2021-08-12 DIAGNOSIS — H02.403 ACQUIRED PTOSIS OF EYELID OF BOTH EYES: Primary | ICD-10-CM

## 2021-08-12 DIAGNOSIS — G70.00 MYASTHENIA GRAVIS (H): ICD-10-CM

## 2021-08-12 DIAGNOSIS — H02.403 ACQUIRED PTOSIS OF EYELID OF BOTH EYES: ICD-10-CM

## 2021-08-12 PROCEDURE — 999N000127 HC STATISTIC PERIPHERAL IV START W US GUIDANCE

## 2021-08-12 PROCEDURE — 67904 REPAIR EYELID DEFECT: CPT | Mod: 50 | Performed by: OPHTHALMOLOGY

## 2021-08-12 PROCEDURE — 67904 REPAIR EYELID DEFECT: CPT | Mod: 50

## 2021-08-12 RX ORDER — ONDANSETRON 2 MG/ML
INJECTION INTRAMUSCULAR; INTRAVENOUS PRN
Status: DISCONTINUED | OUTPATIENT
Start: 2021-08-12 | End: 2021-08-12

## 2021-08-12 RX ORDER — HYDROMORPHONE HYDROCHLORIDE 1 MG/ML
0.2 INJECTION, SOLUTION INTRAMUSCULAR; INTRAVENOUS; SUBCUTANEOUS EVERY 5 MIN PRN
Status: DISCONTINUED | OUTPATIENT
Start: 2021-08-12 | End: 2021-08-13 | Stop reason: HOSPADM

## 2021-08-12 RX ORDER — ONDANSETRON 4 MG/1
4 TABLET, ORALLY DISINTEGRATING ORAL EVERY 30 MIN PRN
Status: DISCONTINUED | OUTPATIENT
Start: 2021-08-12 | End: 2021-08-13 | Stop reason: HOSPADM

## 2021-08-12 RX ORDER — ERYTHROMYCIN 5 MG/G
OINTMENT OPHTHALMIC
Qty: 3.5 G | Refills: 0 | Status: SHIPPED | OUTPATIENT
Start: 2021-08-12 | End: 2021-08-12

## 2021-08-12 RX ORDER — ACETAMINOPHEN 325 MG/1
975 TABLET ORAL ONCE
Status: COMPLETED | OUTPATIENT
Start: 2021-08-12 | End: 2021-08-12

## 2021-08-12 RX ORDER — FENTANYL CITRATE 50 UG/ML
25 INJECTION, SOLUTION INTRAMUSCULAR; INTRAVENOUS EVERY 5 MIN PRN
Status: DISCONTINUED | OUTPATIENT
Start: 2021-08-12 | End: 2021-08-13 | Stop reason: HOSPADM

## 2021-08-12 RX ORDER — SODIUM CHLORIDE, SODIUM LACTATE, POTASSIUM CHLORIDE, CALCIUM CHLORIDE 600; 310; 30; 20 MG/100ML; MG/100ML; MG/100ML; MG/100ML
INJECTION, SOLUTION INTRAVENOUS CONTINUOUS
Status: DISCONTINUED | OUTPATIENT
Start: 2021-08-12 | End: 2021-08-13 | Stop reason: HOSPADM

## 2021-08-12 RX ORDER — ONDANSETRON 2 MG/ML
4 INJECTION INTRAMUSCULAR; INTRAVENOUS EVERY 30 MIN PRN
Status: DISCONTINUED | OUTPATIENT
Start: 2021-08-12 | End: 2021-08-13 | Stop reason: HOSPADM

## 2021-08-12 RX ORDER — ERYTHROMYCIN 5 MG/G
OINTMENT OPHTHALMIC PRN
Status: DISCONTINUED | OUTPATIENT
Start: 2021-08-12 | End: 2021-08-12 | Stop reason: HOSPADM

## 2021-08-12 RX ORDER — PROPOFOL 10 MG/ML
INJECTION, EMULSION INTRAVENOUS PRN
Status: DISCONTINUED | OUTPATIENT
Start: 2021-08-12 | End: 2021-08-12

## 2021-08-12 RX ORDER — LABETALOL HYDROCHLORIDE 5 MG/ML
5 INJECTION, SOLUTION INTRAVENOUS
Status: DISCONTINUED | OUTPATIENT
Start: 2021-08-12 | End: 2021-08-13 | Stop reason: HOSPADM

## 2021-08-12 RX ORDER — LIDOCAINE 40 MG/G
CREAM TOPICAL
Status: DISCONTINUED | OUTPATIENT
Start: 2021-08-12 | End: 2021-08-13 | Stop reason: HOSPADM

## 2021-08-12 RX ORDER — MEPERIDINE HYDROCHLORIDE 25 MG/ML
12.5 INJECTION INTRAMUSCULAR; INTRAVENOUS; SUBCUTANEOUS
Status: DISCONTINUED | OUTPATIENT
Start: 2021-08-12 | End: 2021-08-13 | Stop reason: HOSPADM

## 2021-08-12 RX ORDER — FENTANYL CITRATE 50 UG/ML
INJECTION, SOLUTION INTRAMUSCULAR; INTRAVENOUS PRN
Status: DISCONTINUED | OUTPATIENT
Start: 2021-08-12 | End: 2021-08-12

## 2021-08-12 RX ORDER — ERYTHROMYCIN 5 MG/G
OINTMENT OPHTHALMIC
Qty: 3.5 G | Refills: 0 | Status: SHIPPED | OUTPATIENT
Start: 2021-08-12

## 2021-08-12 RX ORDER — TETRACAINE HYDROCHLORIDE 5 MG/ML
SOLUTION OPHTHALMIC PRN
Status: DISCONTINUED | OUTPATIENT
Start: 2021-08-12 | End: 2021-08-12 | Stop reason: HOSPADM

## 2021-08-12 RX ORDER — HYDRALAZINE HYDROCHLORIDE 20 MG/ML
5 INJECTION INTRAMUSCULAR; INTRAVENOUS EVERY 10 MIN PRN
Status: DISCONTINUED | OUTPATIENT
Start: 2021-08-12 | End: 2021-08-13 | Stop reason: HOSPADM

## 2021-08-12 RX ORDER — SODIUM CHLORIDE, SODIUM LACTATE, POTASSIUM CHLORIDE, CALCIUM CHLORIDE 600; 310; 30; 20 MG/100ML; MG/100ML; MG/100ML; MG/100ML
500 INJECTION, SOLUTION INTRAVENOUS CONTINUOUS
Status: DISCONTINUED | OUTPATIENT
Start: 2021-08-12 | End: 2021-08-13 | Stop reason: HOSPADM

## 2021-08-12 RX ADMIN — FENTANYL CITRATE 25 MCG: 50 INJECTION, SOLUTION INTRAMUSCULAR; INTRAVENOUS at 13:07

## 2021-08-12 RX ADMIN — PROPOFOL 30 MG: 10 INJECTION, EMULSION INTRAVENOUS at 12:12

## 2021-08-12 RX ADMIN — FENTANYL CITRATE 25 MCG: 50 INJECTION, SOLUTION INTRAMUSCULAR; INTRAVENOUS at 12:56

## 2021-08-12 RX ADMIN — ONDANSETRON 4 MG: 2 INJECTION INTRAMUSCULAR; INTRAVENOUS at 12:55

## 2021-08-12 RX ADMIN — FENTANYL CITRATE 25 MCG: 50 INJECTION, SOLUTION INTRAMUSCULAR; INTRAVENOUS at 13:30

## 2021-08-12 RX ADMIN — SODIUM CHLORIDE, SODIUM LACTATE, POTASSIUM CHLORIDE, CALCIUM CHLORIDE 500 ML: 600; 310; 30; 20 INJECTION, SOLUTION INTRAVENOUS at 11:33

## 2021-08-12 RX ADMIN — SODIUM CHLORIDE, SODIUM LACTATE, POTASSIUM CHLORIDE, CALCIUM CHLORIDE: 600; 310; 30; 20 INJECTION, SOLUTION INTRAVENOUS at 12:04

## 2021-08-12 RX ADMIN — PROPOFOL 40 MG: 10 INJECTION, EMULSION INTRAVENOUS at 12:30

## 2021-08-12 RX ADMIN — FENTANYL CITRATE 25 MCG: 50 INJECTION, SOLUTION INTRAMUSCULAR; INTRAVENOUS at 13:23

## 2021-08-12 RX ADMIN — PROPOFOL 40 MG: 10 INJECTION, EMULSION INTRAVENOUS at 12:27

## 2021-08-12 RX ADMIN — ACETAMINOPHEN 975 MG: 325 TABLET ORAL at 11:05

## 2021-08-12 ASSESSMENT — MIFFLIN-ST. JEOR: SCORE: 1315.24

## 2021-08-12 NOTE — OP NOTE
PREOPERATIVE DIAGNOSIS: Ptosis, bilateral upper lid. Myasthenia gravis.  POSTOPERATIVE DIAGNOSIS:  Ptosis,bilateral upper lid. Myasthenia gravis.  PROCEDURE:Both  upper eyelid  ptosis repair by external levator resection.   SURGEON: Jaja Carlson MD  ASSISTANT: Jeannie Nguyen MD and Chari Orlando MD  ANESTHESIA: Monitored with local infiltration of a 50/50 mixture of 2% lidocaine with epinephrine and 0.5% Marcaine.   COMPLICATIONS: None.   ESTIMATED BLOOD LOSS: Less than 5 mL.   HISTORY: Wilma Oden  presented with ptosis of bilateral upper lid interfering with the superior visual field and activities of daily living. After the risks, benefits and alternatives to the proposed procedure were explained, informed consent was obtained.   DESCRIPTION OF PROCEDURE: Wilma Oden  was brought to the operating room and placed supine on the operating table. Intravenous sedation was given. The bilateral upper lid crease and an ellipse of skin was marked with a marking pen and infiltrated with local anesthetic. The area was prepped and draped in the typical sterile ophthalmic fashion. Attention was directed to the right  side. The previously marked ellipse was incised with a 15 blade and the skin flap was excised with high temperature cautery. The orbital septum was opened horizontally. The levator aponeurosis was identified and dissected from the superior tarsal border and the underlying Pineda's muscle and advanced with a double armed 5-0 Mersilene suture to bring the lid into a normal height and contour. Her levator muscle on both sides was severely fatty infiltrated. The suture was passed to partial thickness through the superior tarsal plate and then each end brought underneath the levator aponeurosis. The patient was asked to open the eye and the suture adjusted for height and contour. Nasal fat was conservatively debulked. Attention was directed to the other side where the same procedure was performed. The skin  was closed with with running 6-0 plain gut sutures.   The patient tolerated the procedure well and left the operating room in stable condition.     Jaja Carlson MD

## 2021-08-12 NOTE — ANESTHESIA PREPROCEDURE EVALUATION
"Anesthesia Pre-Procedure Evaluation    Patient: Wilma Oden   MRN: [id number] : 1960        Preoperative Diagnosis: Myasthenia gravis (H) [G70.00]  Acquired ptosis of eyelid of both eyes [H02.403]   Procedure : Procedure(s):  Bilateral upper eyelid ptosis repair     Past Medical History:   Diagnosis Date     Collagenous colitis      Crohn disease (H)      Esophagitis, eosinophilic      Myasthenia gravis (H)       No past surgical history on file.   Allergies   Allergen Reactions     Azathioprine Nausea and Vomiting     Other reaction(s): Vomiting  ALSO SOB  Gi problems  ALSO SOB     Fish Anaphylaxis     All seafood and fish     Latex Anaphylaxis     Anaphylaxis to tree nuts     Shellfish Allergy Anaphylaxis     Tree Nuts  [Nuts] Anaphylaxis     Tree nuts (peanuts are OK)     Levofloxacin Rash     Penicillins Rash     WAS VERY YOUNG DOESN'T REMEMBER VERY MUCH ABOUT IT     Seasonal Allergies      Other reaction(s): Runny Nose  HAYFEVER     Droperidol      Other reaction(s): Hypersensitivity  \"feels like she will crawl out of skin\"  Other reaction(s): Hypersensitivity     Morphine Hives and Itching     When pt recieves morphine sulphate IV pt has burning sensation and hives at site.     Pollen Extract      Other reaction(s): Runny Nose     Prochlorperazine      Extremely jittery.      Clindamycin Nausea and Vomiting     Diarrhea/nausea with oral form     Metoclopramide Other (See Comments)     Anxiety, crawling out of skin     Oxycodone Anxiety     Feels like I am crawling outa my skin      Social History     Tobacco Use     Smoking status: Former Smoker     Quit date: 3/1/1989     Years since quittin.4     Smokeless tobacco: Never Used   Substance Use Topics     Alcohol use: Not Currently      Wt Readings from Last 1 Encounters:   19 69.9 kg (154 lb)        Anesthesia Evaluation   Pt has had prior anesthetic.     No history of anesthetic complications       ROS/MED HX  ENT/Pulmonary:     (+) asthma "     Neurologic: Comment: Myasthenia gravis    (+) migraines,     Cardiovascular:  - neg cardiovascular ROS     METS/Exercise Tolerance:     Hematologic:       Musculoskeletal: Comment: Fibromyalgia       GI/Hepatic: Comment: Primary sclerosing cholangitis    (+) GERD, esophageal disease (esophagitis), Inflammatory bowel disease,     Renal/Genitourinary:  - neg Renal ROS     Endo:  - neg endo ROS     Psychiatric/Substance Use:     (+) psychiatric history anxiety and depression     Infectious Disease:  - neg infectious disease ROS     Malignancy:  - neg malignancy ROS     Other:  - neg other ROS    (+) , H/O Chronic Pain,        Physical Exam    Airway        Mallampati: II   TM distance: > 3 FB   Neck ROM: full   Mouth opening: > 3 cm    Respiratory Devices and Support         Dental           Cardiovascular             Pulmonary                   OUTSIDE LABS:  CBC:   Lab Results   Component Value Date    WBC 7.6 05/21/2019    WBC 7.3 03/23/2018    HGB 14.6 05/21/2019    HGB 10.1 (L) 03/23/2018    HCT 46.4 05/21/2019    HCT 34.6 (L) 03/23/2018     05/21/2019     03/23/2018     BMP:   Lab Results   Component Value Date     05/21/2019     03/23/2018    POTASSIUM 4.2 05/21/2019    POTASSIUM 3.6 03/23/2018    CHLORIDE 107 05/21/2019    CHLORIDE 109 03/23/2018    CO2 22 05/21/2019    CO2 23 03/23/2018    BUN 14 05/21/2019    BUN 11 03/23/2018    CR 0.83 05/21/2019    CR 0.77 03/23/2018    GLC 87 05/21/2019    GLC 90 03/23/2018     COAGS:   Lab Results   Component Value Date    INR 0.98 05/21/2019     POC: No results found for: BGM, HCG, HCGS  HEPATIC:   Lab Results   Component Value Date    ALBUMIN 3.9 05/21/2019    PROTTOTAL 7.8 05/21/2019    ALT 21 05/21/2019    AST 22 05/21/2019    ALKPHOS 122 05/21/2019    BILITOTAL 0.4 05/21/2019     OTHER:   Lab Results   Component Value Date    UYEN 9.4 05/21/2019       Anesthesia Plan    ASA Status:  3   NPO Status:  NPO Appropriate    Anesthesia Type:  MAC.     - Reason for MAC: straight local not clinically adequate   Induction: Intravenous.   Maintenance: TIVA.        Consents    Anesthesia Plan(s) and associated risks, benefits, and realistic alternatives discussed. Questions answered and patient/representative(s) expressed understanding.     - Discussed with:  Patient         Postoperative Care    Pain management: IV analgesics, Oral pain medications.   PONV prophylaxis: Ondansetron (or other 5HT-3), Background Propofol Infusion     Comments:         H&P reviewed: Unable to attach H&P to encounter due to EHR limitations. H&P Update: appropriate H&P reviewed, patient examined. No interval changes since H&P (within 30 days).         Carolina Shukla MD

## 2021-08-12 NOTE — ANESTHESIA POSTPROCEDURE EVALUATION
Patient: Wilma Oden    Procedure(s):  Bilateral upper eyelid ptosis repair    Diagnosis:Myasthenia gravis (H) [G70.00]  Acquired ptosis of eyelid of both eyes [H02.403]  Diagnosis Additional Information: No value filed.    Anesthesia Type:  MAC    Note:  Disposition: Outpatient   Postop Pain Control: Uneventful            Sign Out: Well controlled pain   PONV: No   Neuro/Psych:             Sign Out: Acceptable/Baseline neuro status   Airway/Respiratory: Uneventful            Sign Out: Acceptable/Baseline resp. status   CV/Hemodynamics: Uneventful            Sign Out: Acceptable CV status; No obvious hypovolemia; No obvious fluid overload   Other NRE: NONE   DID A NON-ROUTINE EVENT OCCUR? No           Last vitals:  Vitals Value Taken Time   /72 08/12/21 1330   Temp     Pulse 74 08/12/21 1330   Resp 16 08/12/21 1330   SpO2 97 % 08/12/21 1330       Electronically Signed By: Carolina Shukla MD  August 12, 2021  1:45 PM

## 2021-08-12 NOTE — ANESTHESIA CARE TRANSFER NOTE
Patient: Wilma Oden    Procedure(s):  Bilateral upper eyelid ptosis repair    Diagnosis: Myasthenia gravis (H) [G70.00]  Acquired ptosis of eyelid of both eyes [H02.403]  Diagnosis Additional Information: No value filed.    Anesthesia Type:   MAC     Note:    Oropharynx: spontaneously breathing  Level of Consciousness: awake  Oxygen Supplementation: room air    Independent Airway: airway patency satisfactory and stable  Dentition: dentition unchanged  Vital Signs Stable: post-procedure vital signs reviewed and stable  Report to RN Given: handoff report given  Patient transferred to: Phase II    Handoff Report: Identifed the Patient, Identified the Reponsible Provider, Reviewed the pertinent medical history, Discussed the surgical course, Reviewed Intra-OP anesthesia mangement and issues during anesthesia, Set expectations for post-procedure period and Allowed opportunity for questions and acknowledgement of understanding      Vitals:  Vitals Value Taken Time   /69    Temp 96.5 f    Pulse 79    Resp 18    SpO2 95        Electronically Signed By: CHELSEA Prince CRNA  August 12, 2021  1:14 PM

## 2021-08-12 NOTE — DISCHARGE INSTRUCTIONS
Post-operative Instructions  Ophthalmic Plastic and Reconstructive Surgery    Jaja Carlson M.D.     All instructions apply to the operated eye(s) or eyelid(s).    Wound care and personal care  ? Apply ice compresses 15 minutes of every hour while awake for 2 days. As long as there is no further bleeding, after two days, switch to warm water compresses for five minutes, four times a day until seen by your physician.   ? You may shower or wash your hair the day after surgery. Do not go swimming for at least 2 weeks to prevent contamination of your wounds.  ? You may go for walks and light activity is ok, but no heavy (over 15 pounds) lifting, bending or excessive straining for one week.   ? Do not apply make-up to the eyes or eyelids for 2 weeks after surgery.  ? Expect some swelling, bruising, black eye (even into the lower eyelids and cheeks). Also expect serum caking, crusting and discharge from the eye and/or incisions. A small amount of surface bleeding, and depending on the type of surgery, bleeding from the inside of the eyelid, is normal for the first 48 hours.  ? Avoid straining, bending at the waist, or lifting more than 15 pounds for 10 days. Activities that raise your blood pressure can worsen swelling, cause bleeding, and breaking of sutures. Like wise, sleeping with your head slightly elevated for the first several days can help swelling resolve more quickly.   ? Do continue to ambulate (walk) as you normally would - being sedentary after surgery can cause blood clots.   ? Your eye(s) and eyelid(s) may be painful and tender. This is normal after surgery.    Contact information and follow-up  ? Return to the Eye Clinic for a follow-up appointment with your physician as scheduled. If no appointment has been scheduled:   - [facility] eye clinic: [phone] for an appointment with Dr. Carlson within 1 to 2 weeks from your date of surgery.    -  [facility] eye clinic:  [phone] for an appointment with Dr. Carlson within 1 to 2 weeks from your date of surgery.   ? Only if you are scheduled for a phone or video visit for your first postoperative appointment, please e-mail pictures to [email or web address] 1-2 days before your appointment. If your visit is in person, you do not need to email photos.   ? For severe pain, bleeding, or loss of vision, call the [facility] at [phone] or [facility] at [phone]. After hours or on weekends and holidays, call [phone] and ask to speak with the ophthalmologist on call. An on call person can be reached after hours for concerns. The on call doctor should not call in medication refill requests after hours or on weekends, so please plan accordingly. An effort has been made to provide adequate pain medications following every surgery, and refills will not be provided in most instances.     Activity restrictions and driving  ? Avoid heavy lifting, bending, exercise or strenuous activity for 1 week after surgery.  You may resume other activities and return to work as tolerated.  ? You may not resume driving if you are using narcotic pain medications (such as Oxycodone, Norco, Percocet, Tylenol #3).    Medications  ? Restart all regular home medications and eye drops. If you take Plavix or Aspirin on a regular basis, wait for 72 hours after your surgery before restarting these in order to decrease the risk of bleeding complications.  ? Avoid aspirin and aspirin-like medications (Motrin, Aleve, Ibuprofen, Tayla-Cotuit etc) for 72 hours to reduce the risk of bleeding. You may take Tylenol (acetaminophen) for pain.  ? In addition to your home medications, take the following post-operative medications as prescribed by your physician.  ? Apply antibiotic ointment to all sutures three times a day, and into the operated eye(s) at night.  ? In many cases, postoperative discomfort can be managed  "with Tylenol alone.   ? WARNING: Some pain medications contain acetaminophen. You must not take more than 4,000 mg of acetaminophen per 24-hour period. This is equivalent to 12 tablets of Norco, Percocet or Tylenol #3. If you take other over-the-counter medications containing acetaminophen, you must take the amount of acetaminophen into account and reduce the number of prescribed pain pills accordingly.    [facility]  Home Care Following Anesthesia  For 24 hours after surgery:  1. Get plenty of rest.  A responsible adult must stay with you for at least 24 hours after you leave the surgery center.  2. Do not drive or use heavy equipment.  If you have weakness or tingling, don't drive or use heavy equipment until this feeling goes away.   3. Do not drink alcohol.   4. Avoid strenuous or risky activities.  Ask for help when climbing stairs.  5. You may feel lightheaded.  IF so, sit for a few minutes before standing.  Have someone help you get up.   6. If you have nausea (feel sick to your stomach): Drink only clear liquids such as apple juice, ginger ale, broth or 7-Up.  Rest may also help.  Be sure to drink enough fluids.  Move to a regular diet as you feel able.   7. You may have a slight fever.  Call the doctor if your fever is over 100 F (37.7 C) (taken under the tongue) or lasts longer than 24 hours.  8. You may have a dry mouth, a sore throat, muscle aches or trouble sleeping. These should go away after 24 hours.  9. Do not make important or legal decisions.   10. It is recommended to avoid smoking.        Today you received a Marcaine or bupivacaine block to numb the nerves near your surgery site.  This is a block using local anesthetic or \"numbing\" medication injected around the nerves to anesthetize or \"numb\" the area supplied by those nerves.  This block is injected into the muscle layer near your surgical site.  The medication may numb the location where you had surgery " for 6-18 hours, but may last up to 24 hours.  If your surgical site is an arm or leg you should be careful with your affected limb, since it is possible to injure your limb without being aware of it due to the numbing.  Until full feeling returns, you should guard against bumping or hitting your limb, and avoid extreme hot or cold temperatures on the skin.  As the block wears off, the feeling will return as a tingling or prickly sensation near your surgical site.  You will experience more discomfort from your incision as the feeling returns.  You may want to take a pain pill (a narcotic or Tylenol if this was prescribed by your surgeon) when you start to experience mild pain before the pain beccomes more severe.  If your pain medications do not control your pain you should notifiy your surgeon.    Tips for taking pain medications  To get the best pain relief possible, remember these points:    Take pain medications as directed, before pain becomes severe.    Pain medication can upset your stomach: taking it with food may help.    Constipation is a common side effect of pain medication. Drink plenty of  fluids.    Eat foods high in fiber. Take a stool softener if recommended by your doctor or pharmacist.    Do not drink alcohol, drive or operate machinery while taking pain medications.    Ask about other ways to control pain, such as with heat, ice or relaxation.    Tylenol/Acetaminophen Consumption:  Tylenol 975 mg given at 1130 am.  Next available dose at 550 pm.  To help encourage the safe use of acetaminophen, the makers of TYLENOL  have lowered the maximum daily dose for single-ingredient Extra Strength TYLENOL  (acetaminophen) products sold in the [place] from 8 pills per day (4,000 mg) to 6 pills per day (3,000 mg). The dosing interval has also changed from 2 pills every 4-6 hours to 2 pills every 6 hours.    If you feel your pain relief is insufficient, you may take Tylenol/Acetaminophen in addition to your  narcotic pain medication.     Be careful not to exceed 3,000 mg of Tylenol/Acetaminophen in a 24 hour period from all sources.    If you are taking extra strength Tylenol/acetaminophen (500 mg), the maximum dose is 6 tablets in 24 hours.    If you are taking regular strength acetaminophen (325 mg), the maximum dose is 9 tablets in 24 hours.    Call a doctor for any of the followin. Signs of infection (fever, growing tenderness at the surgery site, a large amount of drainage or bleeding, severe pain, foul-smelling drainage, redness, swelling).  2. It has been over 8 to 10 hours since surgery and you are still not able to urinate (pass water).  3. Headache for over 24 hours.  4. Signs of Covid-19 infection (temperature over 100 degrees, shortness of breath, cough, loss of taste/smell, generalized body aches, persistent headache, chills, sore throat, nausea/vomiting/diarrhea)  Your doctor is:       Dr. Jaja Carlson, Ophthalmology: [phone]               Or dial [phone] and ask for the resident on call for:  Ophthalmology  For emergency care, call the:  [place]:  [phone] (TTY for hearing impaired: [phone])

## 2021-08-25 ENCOUNTER — OFFICE VISIT (OUTPATIENT)
Dept: OPHTHALMOLOGY | Facility: CLINIC | Age: 61
End: 2021-08-25
Payer: COMMERCIAL

## 2021-08-25 DIAGNOSIS — H02.403 INVOLUTIONAL PTOSIS, ACQUIRED, BILATERAL: ICD-10-CM

## 2021-08-25 DIAGNOSIS — G70.00 MYASTHENIA GRAVIS (H): Primary | ICD-10-CM

## 2021-08-25 PROCEDURE — 99024 POSTOP FOLLOW-UP VISIT: CPT | Performed by: OPHTHALMOLOGY

## 2021-08-25 ASSESSMENT — VISUAL ACUITY
CORRECTION_TYPE: GLASSES
OD_CC: 20/30
METHOD: SNELLEN - LINEAR
OS_CC: 20/60

## 2021-08-25 NOTE — NURSING NOTE
Chief Complaints and History of Present Illnesses   Patient presents with     Post Op (Ophthalmology) Both Eyes       Chief Complaint(s) and History of Present Illness(es)     Post Op (Ophthalmology) Both Eyes     Laterality: both eyes              Comments     S/p both upper eyelid ptosis repair by external levator resection 8/12/21. No improvement noted in left eye appearance, pt states she is disappointed in that. Wondering if she can get it re done. Also feels like eyes are sore. Almost out of EES ointment.    Has questions about what anesthesia was used as she had a reaction to it.                 Allyson Morgan, COA

## 2021-08-25 NOTE — PROGRESS NOTES
Chief Complaint(s) and History of Present Illness(es)     Post Op (Ophthalmology) Both Eyes     Laterality: both eyes              Comments     S/p both upper eyelid ptosis repair by external levator resection   8/12/21. No improvement noted in left eye appearance, pt states she is   disappointed in that. Wondering if she can get it re done. Also feels like   eyes are sore. Almost out of EES ointment.    Has questions about what anesthesia was used as she had a reaction to it.            Healing well postop bilateral levator advancement ptosis repair. Under corrected in setting of poor levator function (myasthenia).     Warm compresses  Aquaphor or Vitamin E to incision  Return to clinic in 2 months, if needs revision, consider frontalis sling. Will also need to consider anesthesia as became quite agitated with sedation last time.     Attending Physician Attestation: Complete documentation of historical and exam elements from today's encounter can be found in the full encounter summary report (not reduplicated in this progress note). I personally obtained the chief complaint(s) and history of present illness. I confirmed and edited as necessary the review of systems, past medical/surgical history, family history, social history, and examination findings as documented by others; and I examined the patient myself. I personally reviewed the relevant tests, images, and reports as documented above. I formulated and edited as necessary the assessment and plan and discussed the findings and management plan with the patient and family. I personally reviewed the ophthalmic test(s) associated with this encounter, agree with the interpretation(s) as documented by the resident/fellow, and have edited the corresponding report(s) as necessary. Jaja Carlson MD

## 2021-10-02 ENCOUNTER — HEALTH MAINTENANCE LETTER (OUTPATIENT)
Age: 61
End: 2021-10-02

## 2022-05-08 ENCOUNTER — HEALTH MAINTENANCE LETTER (OUTPATIENT)
Age: 62
End: 2022-05-08

## 2023-01-14 ENCOUNTER — HEALTH MAINTENANCE LETTER (OUTPATIENT)
Age: 63
End: 2023-01-14

## 2023-12-03 ENCOUNTER — HEALTH MAINTENANCE LETTER (OUTPATIENT)
Age: 63
End: 2023-12-03

## (undated) DEVICE — GLOVE PROTEXIS MICRO 7.5  2D73PM75

## (undated) DEVICE — ESU EYE HIGH TEMP 65410-183

## (undated) DEVICE — LINEN TOWEL PACK X5 5464

## (undated) DEVICE — PACK MINOR EYE CUSTOM ASC

## (undated) DEVICE — SOL WATER IRRIG 500ML BOTTLE 2F7113

## (undated) DEVICE — EYE PREP BETADINE 5% SOLUTION 30ML 0065-0411-30

## (undated) RX ORDER — FENTANYL CITRATE 50 UG/ML
INJECTION, SOLUTION INTRAMUSCULAR; INTRAVENOUS
Status: DISPENSED
Start: 2021-08-12

## (undated) RX ORDER — PROPOFOL 10 MG/ML
INJECTION, EMULSION INTRAVENOUS
Status: DISPENSED
Start: 2021-08-12

## (undated) RX ORDER — ONDANSETRON 2 MG/ML
INJECTION INTRAMUSCULAR; INTRAVENOUS
Status: DISPENSED
Start: 2021-08-12

## (undated) RX ORDER — ACETAMINOPHEN 325 MG/1
TABLET ORAL
Status: DISPENSED
Start: 2021-08-12